# Patient Record
Sex: MALE | Race: WHITE | NOT HISPANIC OR LATINO | Employment: FULL TIME | URBAN - METROPOLITAN AREA
[De-identification: names, ages, dates, MRNs, and addresses within clinical notes are randomized per-mention and may not be internally consistent; named-entity substitution may affect disease eponyms.]

---

## 2017-01-20 ENCOUNTER — COMMUNICATION - HEALTHEAST (OUTPATIENT)
Dept: INTERNAL MEDICINE | Facility: CLINIC | Age: 42
End: 2017-01-20

## 2017-02-22 ENCOUNTER — COMMUNICATION - HEALTHEAST (OUTPATIENT)
Dept: INTERNAL MEDICINE | Facility: CLINIC | Age: 42
End: 2017-02-22

## 2017-08-08 ENCOUNTER — RECORDS - HEALTHEAST (OUTPATIENT)
Dept: GENERAL RADIOLOGY | Facility: CLINIC | Age: 42
End: 2017-08-08

## 2017-08-08 ENCOUNTER — OFFICE VISIT - HEALTHEAST (OUTPATIENT)
Dept: INTERNAL MEDICINE | Facility: CLINIC | Age: 42
End: 2017-08-08

## 2017-08-08 DIAGNOSIS — R22.32 SKIN LUMP OF ARM, LEFT: ICD-10-CM

## 2017-08-08 DIAGNOSIS — R07.81 RIB PAIN ON LEFT SIDE: ICD-10-CM

## 2017-08-08 DIAGNOSIS — R07.81 PLEURODYNIA: ICD-10-CM

## 2017-08-08 ASSESSMENT — MIFFLIN-ST. JEOR: SCORE: 1754.01

## 2017-10-05 ENCOUNTER — RECORDS - HEALTHEAST (OUTPATIENT)
Dept: ADMINISTRATIVE | Facility: OTHER | Age: 42
End: 2017-10-05

## 2017-10-27 ENCOUNTER — SURGERY - HEALTHEAST (OUTPATIENT)
Dept: SURGERY | Facility: CLINIC | Age: 42
End: 2017-10-27

## 2017-10-27 ENCOUNTER — ANESTHESIA - HEALTHEAST (OUTPATIENT)
Dept: SURGERY | Facility: CLINIC | Age: 42
End: 2017-10-27

## 2017-10-27 ASSESSMENT — MIFFLIN-ST. JEOR: SCORE: 1739.95

## 2017-11-27 ENCOUNTER — COMMUNICATION - HEALTHEAST (OUTPATIENT)
Dept: SCHEDULING | Facility: CLINIC | Age: 42
End: 2017-11-27

## 2017-11-28 ENCOUNTER — OFFICE VISIT - HEALTHEAST (OUTPATIENT)
Dept: INTERNAL MEDICINE | Facility: CLINIC | Age: 42
End: 2017-11-28

## 2017-11-28 DIAGNOSIS — L03.119 CELLULITIS AND ABSCESS OF LEG: ICD-10-CM

## 2017-11-28 DIAGNOSIS — L02.419 CELLULITIS AND ABSCESS OF LEG: ICD-10-CM

## 2017-11-28 ASSESSMENT — MIFFLIN-ST. JEOR: SCORE: 1785.77

## 2017-12-26 ENCOUNTER — RECORDS - HEALTHEAST (OUTPATIENT)
Dept: ADMINISTRATIVE | Facility: OTHER | Age: 42
End: 2017-12-26

## 2018-03-08 ENCOUNTER — OFFICE VISIT - HEALTHEAST (OUTPATIENT)
Dept: FAMILY MEDICINE | Facility: CLINIC | Age: 43
End: 2018-03-08

## 2018-03-08 DIAGNOSIS — H53.8 BLURRY VISION, RIGHT EYE: ICD-10-CM

## 2018-03-08 ASSESSMENT — MIFFLIN-ST. JEOR: SCORE: 1758.42

## 2019-03-01 ENCOUNTER — OFFICE VISIT - HEALTHEAST (OUTPATIENT)
Dept: INTERNAL MEDICINE | Facility: CLINIC | Age: 44
End: 2019-03-01

## 2019-03-01 DIAGNOSIS — R19.7 DIARRHEA, UNSPECIFIED TYPE: ICD-10-CM

## 2019-03-01 DIAGNOSIS — R51.9 HEADACHE: ICD-10-CM

## 2019-03-01 LAB
FLUAV AG SPEC QL IA: NORMAL
FLUBV AG SPEC QL IA: NORMAL

## 2019-03-01 ASSESSMENT — MIFFLIN-ST. JEOR: SCORE: 1733.61

## 2019-12-12 ENCOUNTER — RECORDS - HEALTHEAST (OUTPATIENT)
Dept: ADMINISTRATIVE | Facility: OTHER | Age: 44
End: 2019-12-12

## 2020-02-12 ENCOUNTER — COMMUNICATION - HEALTHEAST (OUTPATIENT)
Dept: INTERNAL MEDICINE | Facility: CLINIC | Age: 45
End: 2020-02-12

## 2020-12-16 ENCOUNTER — OFFICE VISIT - HEALTHEAST (OUTPATIENT)
Dept: INTERNAL MEDICINE | Facility: CLINIC | Age: 45
End: 2020-12-16

## 2020-12-16 DIAGNOSIS — R21 PERIANAL RASH: ICD-10-CM

## 2020-12-16 RX ORDER — TRIAMCINOLONE ACETONIDE 1 MG/G
CREAM TOPICAL
Qty: 15 G | Refills: 0 | Status: SHIPPED | OUTPATIENT
Start: 2020-12-16 | End: 2022-04-14

## 2020-12-16 ASSESSMENT — PATIENT HEALTH QUESTIONNAIRE - PHQ9: SUM OF ALL RESPONSES TO PHQ QUESTIONS 1-9: 0

## 2020-12-22 ENCOUNTER — OFFICE VISIT - HEALTHEAST (OUTPATIENT)
Dept: INTERNAL MEDICINE | Facility: CLINIC | Age: 45
End: 2020-12-22

## 2020-12-22 DIAGNOSIS — L30.9 PERIANAL DERMATITIS: ICD-10-CM

## 2020-12-22 ASSESSMENT — MIFFLIN-ST. JEOR: SCORE: 1756.29

## 2020-12-31 ENCOUNTER — COMMUNICATION - HEALTHEAST (OUTPATIENT)
Dept: INTERNAL MEDICINE | Facility: CLINIC | Age: 45
End: 2020-12-31

## 2021-05-27 ASSESSMENT — PATIENT HEALTH QUESTIONNAIRE - PHQ9: SUM OF ALL RESPONSES TO PHQ QUESTIONS 1-9: 0

## 2021-05-31 VITALS — BODY MASS INDEX: 29.08 KG/M2 | WEIGHT: 191.9 LBS | HEIGHT: 68 IN

## 2021-05-31 VITALS — HEIGHT: 68 IN | WEIGHT: 195 LBS | BODY MASS INDEX: 29.55 KG/M2

## 2021-05-31 VITALS — HEIGHT: 68 IN | BODY MASS INDEX: 30.62 KG/M2 | WEIGHT: 202 LBS

## 2021-06-01 VITALS — WEIGHT: 196 LBS | HEIGHT: 68 IN | BODY MASS INDEX: 29.7 KG/M2

## 2021-06-02 VITALS — BODY MASS INDEX: 30.45 KG/M2 | WEIGHT: 194 LBS | HEIGHT: 67 IN

## 2021-06-05 VITALS
OXYGEN SATURATION: 95 % | BODY MASS INDEX: 31.23 KG/M2 | DIASTOLIC BLOOD PRESSURE: 82 MMHG | HEART RATE: 88 BPM | SYSTOLIC BLOOD PRESSURE: 128 MMHG | HEIGHT: 67 IN | WEIGHT: 199 LBS

## 2021-06-06 NOTE — TELEPHONE ENCOUNTER
FYI - Status Update  Who is Calling: Patient  Update: Patient was calling in for the same request, a retroactive referral for a date of service 12/12/19. Writer will re-route this message to [facility] because patient was seen by Dr. Zapata, who is the provider who made the referral to [facility]. Patient stated he was referred from [facility] to the [facility].  Okay to leave a detailed message?:  Yes [phone]

## 2021-06-06 NOTE — TELEPHONE ENCOUNTER
Type of referral:  Surgical  What provider or facility are you being referred to?  [facility]  Do you have an appointment scheduled?  No, this is for past and future visits  What is your question?  Caller is asking for referral from 12/2019 visit and also needs future referral placed for visits every 6 months. Authorization number for referral is [id number]. Please contact caller if any additional information is needed.  Okay to leave a detailed message: Yes

## 2021-06-06 NOTE — TELEPHONE ENCOUNTER
Dr. Friend's office from [facility] will complete insurance referral since he is the PCP on account.     Insurance referral completed and auto faxed to [employer].     Date of Services 12/11/19 to 12/31/20 for 4 visits.

## 2021-06-12 NOTE — PROGRESS NOTES
"  Office Visit - Follow Up   Star Delgado   41 y.o. male    Date of Visit: 8/8/2017    Chief Complaint   Patient presents with     Mass     on left arm     Chest Pain     walked into the handle of a wheel barrell.        Assessment and Plan   1. Skin lump of arm, left  - Ambulatory referral to General Surgery    2. Rib pain on left side  I suspect he broke a rib, normal healing process  - XR Ribs Left W PA Chest; Future      Return if symptoms worsen or fail to improve, for recheck.     History of Present Illness   This 41 y.o. old man comes in for evaluation of a lump on his left arm.  He noticed this over the past month or so.  It has been getting a little bit larger.  It came on suddenly.  It is firm nontender, mobile.  No other lumps or bumps.  No fever chills no night sweats.  No injury to the area.  Additionally 2 months ago he was using a wheelbarrow in the wheelbarrow caught in 1 of the handles him in the ribs on the left side.  He had extensive bruising and pain for about 2 weeks.  The bruising has resolved but he still has pain especially when he takes a deep breath.  He otherwise is feeling okay.  Breathing difficulties otherwise.  No abdominal pain.    Review of Systems: A comprehensive review of systems was negative except as noted.     Medications, Allergies and Problem List   Reviewed and updated     Physical Exam   General Appearance: No acute distress    /64 (Patient Site: Right Arm, Patient Position: Sitting, Cuff Size: Adult Regular)  Pulse 73  Ht 5' 8\" (1.727 m)  Wt 195 lb (88.5 kg)  SpO2 98%  BMI 29.65 kg/m2    On his left arm forearm he has about a 2 cm x 1 cm subcutaneous lump which is irregular firm and mobile with some erythema of the skin above.  He has mild pain with palpations of the ribs on the left his heart is regular no murmur gallop or rub lungs are clear to auscultation bilaterally abdomen is soft nontender nondistended     Additional Information   No current " outpatient prescriptions on file.     No current facility-administered medications for this visit.      No Known Allergies  Social History   Substance Use Topics     Smoking status: Never Smoker     Smokeless tobacco: None     Alcohol use None       Review and/or order of clinical lab tests:  Review and/or order of radiology tests:  Review and/or order of medicine tests:  Discussion of test results with performing physician:  Decision to obtain old records and/or obtain history from someone other than the patient:  Review and summarization of old records and/or obtaining history from someone other than the patient and.or discussion of case with another health care provider:  Independent visualization of image, tracing or specimen itself:    Time:      Hossein Friend MD

## 2021-06-13 NOTE — PROGRESS NOTES
"Star Delgado is a 45 y.o. male who is being evaluated via a billable video visit.              Damian Nguyễn MDThe patient has been notified of following:     \"This video visit will be conducted via a call between you and your physician/provider. We have found that certain health care needs can be provided without the need for an in-person physical exam.  This service lets us provide the care you need with a video conversation.  If a prescription is necessary we can send it directly to your pharmacy.  If lab work is needed we can place an order for that and you can then stop by our lab to have the test done at a later time.    Video visits are billed at different rates depending on your insurance coverage. Please reach out to your insurance provider with any questions.    If during the course of the call the physician/provider feels a video visit is not appropriate, you will not be charged for this service.\"    Patient has given verbal consent to a Video visit? Yes  How would you like to obtain your AVS? AVS Preference: MyChart.  If dropped by the video visit, the video invitation should be sent to: Send to e-mail at: [email or web address]  Will anyone else be joining your video visit? No        Video Start Time: 1733    Office Visit - Follow Up   Star Delgado   45 y.o. male    Date of Visit: 12/16/2020    Chief Complaint   Patient presents with     Rectal Bleeding     Some spotting, itch, discharge, started years ago but just not improving, recently has skin rash        Assessment and Plan   1. Perianal rash  Perianal irritation we discussed the differential.  Could be psoriatic as this could be a lichenification and irritation from ongoing itching.  I am going to have him utilize some triamcinolone cream for the next few days I want to see him back early next week and will do an exam so I can see what were dealing with here.  He has been told that he had an eye issue that could be made worse by " steroids in the past.  I reassured him that a few days of this and a limited area should not cause any major systemic side effects.  - triamcinolone (KENALOG) 0.1 % cream; Apply two times a day to affected area for no more than 10 days.  Dispense: 15 g; Refill: 0        No follow-ups on file.     History of Present Illness   This 45 y.o. old patient of Dr. Redd's with a history of some perianal irritation as well as some psoriasis on his legs he tells me but never followed by dermatologist who comes in via video for evaluation of ongoing perianal irritation and itch and bleeding and discharge.  He does not think there is major GI bleeding or blood in the stool but occasionally may seek some blood in the stool.  Its primarily on the toilet paper however.  Again the area is quite itchy.  No fevers or chills.  He was going to come in but his wife is concerned about him coming into the clinic.    Review of Systems: A comprehensive review of systems was negative except as noted.     Medications, Allergies and Problem List   Reviewed, reconciled and updated  Post Discharge Medication Reconciliation Status:      Physical Exam   General Appearance:   Pleasant gentleman in no distress vital signs are noted.  Further exam deferred.    There were no vitals taken for this visit.         Additional Information   Current Outpatient Medications   Medication Sig Dispense Refill     triamcinolone (KENALOG) 0.1 % cream Apply two times a day to affected area for no more than 10 days. 15 g 0     No current facility-administered medications for this visit.      No Known Allergies  Social History     Tobacco Use     Smoking status: Never Smoker     Smokeless tobacco: Never Used   Substance Use Topics     Alcohol use: Yes     Alcohol/week: 1.0 - 2.0 standard drinks     Types: 1 - 2 Cans of beer per week     Drug use: No       Review and/or order of clinical lab tests:  Review and/or order of radiology tests:  Review and/or order of  medicine tests:  Discussion of test results with performing physician:  Decision to obtain old records and/or obtain history from someone other than the patient:  Review and summarization of old records and/or obtaining history from someone other than the patient and.or discussion of case with another health care provider:  Independent visualization of image, tracing or specimen itself:    Time:      Damian Nguyễn MD    Additional provider notes:       Video-Visit Details    Type of service:  Video Visit    Video End Time (time video stopped): 1746  Originating Location (pt. Location): Home    Distant Location (provider location):  [facility]     Platform used for Video Visit[facility]    Damian Nguyễn MD

## 2021-06-14 NOTE — PROGRESS NOTES
"  Office Visit - Follow Up   Star Delgado   45 y.o. male    Date of Visit: 12/22/2020    Chief Complaint   Patient presents with     Rash     rectal rash follow up - using Kenalog prn         Assessment and Plan   1. Perianal dermatitis  Much better control with the triamcinolone.  He will continue for another 5 days.  If things do not completely continue to resolve after that point or worsen they will let me know.    As an aside, he rode his bike here to our office.  He had helmet.  I asked him about lites and he stated he did not have any.  I urged him to walk his bike home or call someone to help get him as I was concerned about him riding at night with no safety lights.        No follow-ups on file.     History of Present Illness   This 45 y.o. old patient of Dr. Galvez comes in today for evaluation of perianal rash.  I was concerned about what we were dealing with his I did a video visit with him last week and I was sure.  I put him on triamcinolone and he states he is doing better.  Less itching.  No bleeding.    Review of Systems: A comprehensive review of systems was negative except as noted.     Medications, Allergies and Problem List   Reviewed, reconciled and updated  Post Discharge Medication Reconciliation Status:      Physical Exam   General Appearance:   Pleasant gentleman no distress.  He has some erythema of the gluteal cleft and perianal area but no open areas and it looks like things are healing up nicely.  Consistent with a dermatitis.  No evidence of infection or cellulitis or dermatophyte.    /82 (Patient Site: Left Arm, Patient Position: Sitting, Cuff Size: Adult Regular)   Pulse 88   Ht 5' 7\" (1.702 m)   Wt 199 lb (90.3 kg)   SpO2 95%   BMI 31.17 kg/m           Additional Information   Current Outpatient Medications   Medication Sig Dispense Refill     triamcinolone (KENALOG) 0.1 % cream Apply two times a day to affected area for no more than 10 days. 15 g 0     No current " facility-administered medications for this visit.      No Known Allergies  Social History     Tobacco Use     Smoking status: Never Smoker     Smokeless tobacco: Never Used   Substance Use Topics     Alcohol use: Yes     Alcohol/week: 1.0 - 2.0 standard drinks     Types: 1 - 2 Cans of beer per week     Drug use: No       Review and/or order of clinical lab tests:  Review and/or order of radiology tests:  Review and/or order of medicine tests:  Discussion of test results with performing physician:  Decision to obtain old records and/or obtain history from someone other than the patient:  Review and summarization of old records and/or obtaining history from someone other than the patient and.or discussion of case with another health care provider:  Independent visualization of image, tracing or specimen itself:    Time: not applicable     Damian Nguyễn MD

## 2021-06-14 NOTE — PROGRESS NOTES
"OFFICE VISIT NOTE    Subjective:   Chief Complaint:  Wound Infection (surgical incision on left inner thigh.)    42-year-old man who biopsy about 3 or 4 weeks ago of some lesions on his leg and left arm.  Biopsy report shows a pilomatrixoma.  On his left thigh biopsy site he developed some increasing redness and slight amount of drainage.  No fevers or chills.    Current Outpatient Prescriptions   Medication Sig     cephalexin (KEFLEX) 500 MG capsule Take 1 capsule (500 mg total) by mouth 4 (four) times a day for 5 days.       Review of Systems:  A comprehensive review of systems is negative except for the comments above    Objective:    Pulse 64  Temp 98.7  F (37.1  C) (Oral)   Ht 5' 8\" (1.727 m)  Wt 202 lb (91.6 kg)  SpO2 97%  BMI 30.71 kg/m2  GENERAL: No acute distress.  He has a quarter-sized area of erythema.  On the central aspect of the lesion there is a small amount of yellow drainage.  Biopsy site in the left arm looks okay.    Assessment & Plan   Star Delgado is a 42 y.o. male.    Cellulitis to the area of previous biopsy.  I have asked him to put warm packs in this area twice daily for 15-20 minutes.  Cephalexin 500 4 times daily.  Cover with a loose bandage.  I think this will disappear rather quickly.  He wants a copy of the biopsy report.  I told the patient, Dr. Friend should check the biopsy site in about 5 weeks.    Diagnoses and all orders for this visit:    Cellulitis and abscess of leg  -     cephalexin (KEFLEX) 500 MG capsule; Take 1 capsule (500 mg total) by mouth 4 (four) times a day for 5 days.  Dispense: 20 capsule; Refill: 0        Rodrigue Shelley MD  Transcription using voice recognition software, may contain typographical errors.    "

## 2021-06-16 NOTE — PROGRESS NOTES
"Assessment and Plan    1. Blurry vision, right eye  No clear cause; exam normal  - Ambulatory referral to Ophthalmology     Discussed with him and our schedulers that our goal would be to have him be seen today or tomorrow    Jayda Zapata MD     -------------------------------------------    Chief Complaint   Patient presents with     Eye Burn     blurring vision shadow upon closing and opening and slight stinging sensation     Symptoms started in Star's right eye - not sure how long they have been there - a few weeks.  Started to notice that right eye was blurrier: he was trying to read a pill bottle - had a problem seeing it. . Left eye is clear - right eye is blurry \"the world sometimes looks like a hologram.\"   He is noting a little soreness in right eye. Feels like there might be stuff in right eye. No aching. He describes the feeling in right as like when he gets salty sweat in his eyes. When he looks ahead he sees a circular area in his vision that is a little darker and blurrier than the surrounding areas - only with both eye or right eye, not with left eye.  He cn think of nothing that could have irritated his eyes, other than  getting more screen time than usual at work due to staffing shortages    ROS:  - No ongoing medical problems  - No recent illnesses - short live flu  - No fever, no joint aches (other than \"I'm 42 years old\")   - he has been getting fewer hours of sleep than he should  - No headaches or anything out of the usual  - Family members have glasses but not glaucoma  - No numbness or tingling      Works for [employer] [place] [employer] [place]  Other concerns:    Patient Active Problem List   Diagnosis     Unspecified acute reaction to stress       No current outpatient prescriptions on file prior to visit.     No current facility-administered medications on file prior to visit.        History   Smoking Status     Never Smoker   Smokeless Tobacco     Never Used       History   Alcohol Use     " 0.6 - 1.2 oz/week     1 - 2 Cans of beer per week         Vitals:    03/08/18 1200   BP: 120/80   Pulse: 89   Resp: 14   SpO2: 98%     Body mass index is 29.81 kg/(m^2).     EXAM:    General appearance - alert, well appearing, and in no distress  Mental status - normal mood, behavior, speech, dress, motor activity, and thought processes  Eyes - pupils equal and reactive to light and accomodation, extraocular eye movements intact, funduscopic exam normal, discs flat and sharp, sclera and conjunctiva clear  Mouth - mucous membranes moist, pharynx normal without lesions  Neurological - cranial nerves II through XII intact

## 2021-06-24 NOTE — PROGRESS NOTES
"  Office Visit - Follow Up   Star Delgado   43 y.o. male    Date of Visit: 3/1/2019    Chief Complaint   Patient presents with     Flu Symptoms     5-6 days- headaches, upset stomach, feeling nauseous, chills/cold. Taking Advil and Pepto.      Diarrhea     5-6 days         Assessment and Plan   1. Diarrhea, unspecified type  Consistent with infectious diarrhea, likely self-limited although duration is a bit long.  He will bring in stool samples if diarrhea persists over the weekend.  Trial of Imodium.  - Culture, Stool; Future  - C. difficile Toxigenic by PCR; Future    2. Headache  - Influenza A/B Rapid Test- Nasal Swab    Return in about 2 weeks (around 3/15/2019) for recheck.     History of Present Illness   This 43 y.o. old man comes in for evaluation of diarrhea.  This is been going on for 4-5 days.  Started with some nausea without vomiting and now is having multiple loose watery stools per day.  He has tried some Pepto-Bismol.  This is not helped much.  No black or tarry stools until he started Pepto-Bismol and all stools are black.  No blood in the stool.  Some mild abdominal pain.  Possible fever initially now resolved.  No other symptoms.  Had some tacos night before onset.  Wife had similar symptoms.  Kids okay.    Review of Systems: A comprehensive review of systems was negative except as noted.     Medications, Allergies and Problem List   Reviewed, reconciled and updated     Physical Exam   General Appearance:   No acute distress    /76 (Patient Site: Right Arm, Patient Position: Sitting, Cuff Size: Adult Regular)   Pulse 60   Ht 5' 7\" (1.702 m)   Wt 194 lb (88 kg)   SpO2 98%   BMI 30.38 kg/m        Cardiovascular regular rate and rhythm no murmur gallop or rub  Pulmonary lungs are clear to auscultation bilaterally  Gastrointestinall abdomen soft nontender nondistended no organomegaly  Neurologic exam is non focal  Psychiatric pleasant, no confusion or agitation        Additional " Information   No current outpatient medications on file.     No current facility-administered medications for this visit.      No Known Allergies  Social History     Tobacco Use     Smoking status: Never Smoker     Smokeless tobacco: Never Used   Substance Use Topics     Alcohol use: Yes     Alcohol/week: 0.6 - 1.2 oz     Types: 1 - 2 Cans of beer per week     Drug use: No       Review and/or order of clinical lab tests:  Review and/or order of radiology tests:  Review and/or order of medicine tests:  Discussion of test results with performing physician:  Decision to obtain old records and/or obtain history from someone other than the patient:  Review and summarization of old records and/or obtaining history from someone other than the patient and.or discussion of case with another health care provider:  Independent visualization of image, tracing or specimen itself:    Time:      Hossein Friend MD

## 2021-06-26 ENCOUNTER — HEALTH MAINTENANCE LETTER (OUTPATIENT)
Age: 46
End: 2021-06-26

## 2021-08-23 ENCOUNTER — NURSE TRIAGE (OUTPATIENT)
Dept: NURSING | Facility: CLINIC | Age: 46
End: 2021-08-23

## 2021-08-23 NOTE — TELEPHONE ENCOUNTER
Wife calling.    Vertigo and balance issues started 2 weeks ago.  Similar symptoms 20 years ago.    Yesterday nausea and vomiting was bad and went to urgency room.  Every time he moves he vomits. Was prescribed 2 medications.    Caller not with patient now, she is in car dropping off kids.    She reports patient has vomited 6 x in the past hour alone.  She is not sure if he is urinating any longer.  She says he looks miserable. She agrees he is significantly worse since leaving ER last ngiht and has lost all fluids he was given via IV last night by vomiting over the last 12 + hours.    Bring him to the ER for evaluation.  She agrees with plan.    Hoa Mercado RN  [facility] Nurse Advisor      Reason for Disposition    SEVERE vomiting (e.g., 6 or more times/day) (Exception: patient sounds well, is drinking liquids, does not sound dehydrated, and vomiting has lasted less than 24 hours)    Additional Information    Negative: Shock suspected (e.g., cold/pale/clammy skin, too weak to stand, low BP, rapid pulse)    Negative: Difficult to awaken or acting confused (e.g., disoriented, slurred speech)    Negative: Sounds like a life-threatening emergency to the triager    Negative: Vomiting occurs only while coughing    Negative: Pregnant < 20 Weeks and nausea/vomiting began in early pregnancy (i.e., 4-8 weeks pregnant)    Negative: Chest pain    Negative: Headache is main symptom    Protocols used: VOMITING-A-OH

## 2021-08-23 NOTE — TELEPHONE ENCOUNTER
RN Triage:    Spoke with 45 yr old Star who is calling back for triage:    Pt states his wife spoke to triage earlier today, but didn't have the correct information about his episodes of vomiting.    Pt reports:    2 weeks ago developed vertigo.    [facility] for [facility], vomiting, and vertigo.    CT scan negative.      Given 2 medications.  Meclizine and Zofran.    This morning nausea and vomiting again, but only 1 episode.    Pt states he does become nauseated after eating and drinking, but has urinated within 12 hours.    Denies current dizziness but feels tired after taking the Meclizine.    Hx of similar symptoms 15- 20 years ago.    PLAN:  Advised OV within 2 weeks per protocol.  Care advice given per dizziness protocol.  Advised to call back if symptoms are worsening.  Transferred to PSR to schedule OV.    Katelynn Rodriguez RN  [facility]    Reason for Disposition    Dizziness not present now, but is a chronic symptom (recurrent or ongoing AND lasting > 4 weeks)     2 weeks and hx of similar vertigo 15 years ago.    Additional Information    Negative: Shock suspected (e.g., cold/pale/clammy skin, too weak to stand, low BP, rapid pulse)    Negative: Difficult to awaken or acting confused (e.g., disoriented, slurred speech)    Negative: Fainted, and still feels dizzy afterwards    Negative: Severe difficulty breathing (e.g., struggling for each breath, speaks in single words)    Negative: Overdose (accidental or intentional) of medications    Negative: New neurologic deficit that is present now: * Weakness of the face, arm, or leg on one side of the body * Numbness of the face, arm, or leg on one side of the body * Loss of speech or garbled speech    Negative: Heart beating < 50 beats per minute OR > 140 beats per minute    Negative: Sounds like a life-threatening emergency to the triager    Negative: Chest pain    Negative: Rectal bleeding, bloody stool, or tarry-black  stool    Negative: Vomiting is the main symptom    Negative: Diarrhea is the main symptom    Negative: Headache is the main symptom    Negative: Heat exhaustion suspected (i.e., dehydration from heat exposure)    Negative: Patient states that he/she is having an anxiety/panic attack    Negative: SEVERE dizziness (e.g., unable to stand, requires support to walk, feels like passing out now)    Negative: SEVERE headache or neck pain    Negative: Spinning or tilting sensation (vertigo) present now and one or more stroke risk factors (i.e., hypertension, diabetes, prior stroke/TIA, heart attack, age over 60) (Exception: prior physician evaluation for this AND no different/worse than usual)    Negative: Loss of vision or double vision    Negative: Extra heart beats OR irregular heart beating (i.e., 'palpitations')    Negative: Difficulty breathing    Negative: Drinking very little and has signs of dehydration (e.g., no urine > 12 hours, very dry mouth, very lightheaded)    Negative: Follows bleeding (e.g., stomach, rectum, vagina) (Exception: became dizzy from sight of small amount blood)    Negative: Patient sounds very sick or weak to the triager    Negative: Lightheadedness (dizziness) present now, after 2 hours of rest and fluids    Negative: Spinning or tilting sensation (vertigo) present now    Negative: Fever > 103 F (39.4 C)    Negative: Fever > 100.0 F (37.8 C) and has diabetes mellitus or a weak immune system (e.g., HIV positive, cancer chemotherapy, organ transplant, splenectomy, chronic steroids)    Negative: Vomiting occurs with dizziness     Has been evaluated.  [facility]    Negative: Patient wants to be seen    Negative: Taking a medicine that could cause dizziness (e.g., blood pressure medications, diuretics)    Negative: Diabetes    Protocols used: DIZZINESS-A-OH

## 2021-08-25 ENCOUNTER — OFFICE VISIT (OUTPATIENT)
Dept: INTERNAL MEDICINE | Facility: CLINIC | Age: 46
End: 2021-08-25
Payer: COMMERCIAL

## 2021-08-25 VITALS
BODY MASS INDEX: 32.02 KG/M2 | HEIGHT: 67 IN | HEART RATE: 74 BPM | DIASTOLIC BLOOD PRESSURE: 68 MMHG | SYSTOLIC BLOOD PRESSURE: 110 MMHG | OXYGEN SATURATION: 98 % | WEIGHT: 204 LBS

## 2021-08-25 DIAGNOSIS — H61.22 IMPACTED CERUMEN OF LEFT EAR: Primary | ICD-10-CM

## 2021-08-25 DIAGNOSIS — H81.12 BENIGN PAROXYSMAL POSITIONAL VERTIGO, LEFT: ICD-10-CM

## 2021-08-25 PROCEDURE — 69209 REMOVE IMPACTED EAR WAX UNI: CPT | Mod: LT | Performed by: NURSE PRACTITIONER

## 2021-08-25 PROCEDURE — 99213 OFFICE O/P EST LOW 20 MIN: CPT | Mod: 25 | Performed by: NURSE PRACTITIONER

## 2021-08-25 RX ORDER — MECLIZINE HYDROCHLORIDE 25 MG/1
1 TABLET ORAL PRN
COMMUNITY
Start: 2021-08-22 | End: 2022-04-14

## 2021-08-25 RX ORDER — ONDANSETRON 4 MG/1
1 TABLET, ORALLY DISINTEGRATING ORAL PRN
COMMUNITY
Start: 2021-08-22 | End: 2022-04-14

## 2021-08-25 ASSESSMENT — MIFFLIN-ST. JEOR: SCORE: 1778.97

## 2021-08-25 NOTE — PATIENT INSTRUCTIONS
Keep your ears clean and dry.    Take the Meclizine and Zofran as needed.    Follow up if having continued symptoms.    See attached handout.   Patient Education     Benign Paroxysmal Positional Vertigo     Your health care provider may move your head in certain ways to treat your BPPV.   Benign paroxysmal positional vertigo (BPPV) is a problem with the inner ear. The inner ear contains the vestibular system. This system is what helps you keep your balance. BPPV causes a feeling of spinning. It is a common problem of the vestibular system.   Understanding the vestibular system  The vestibular system of the ear is made up of very tiny parts. They include the utricle, saccule, and semicircular canals. The utricle is a tiny organ that contains calcium crystals. In some people, the crystals can move into the semicircular canals. When this happens, the system no longer works as it should. This causes BPPV. Benign means it is not life threatening. Paroxysmal means it happens suddenly. Positional means that it happens when you move your head. Vertigo is a feeling of spinning.   What causes BPPV?  Causes include injury to your head or neck. Other problems with the vestibular system may cause BPPV. In many people, the cause of BPPV is not known.   Symptoms of BPPV  You may have repeated feelings of spinning (vertigo). The vertigo usually lasts less than 1 minute. Some movements, such as rolling over in bed, can bring on vertigo.   Diagnosing BPPV  Your primary healthcare provider may diagnose and treat your BPPV. Or you may see an ear, nose, and throat healthcare provider (otolaryngologist). In some cases, you may see a healthcare provider who focuses on the nervous system (neurologist).   The healthcare provider will ask about your symptoms and your medical history. He or she will examine you. You may have hearing and balance tests. As part of the exam, your healthcare provider may have you move your head and body in  certain ways. If you have BPPV, the movements can bring on vertigo. Your provider will also look for abnormal movements of your eyes. You may have other tests to check your vestibular or nervous systems.   Treatment for BPPV  Your healthcare provider may try to move the calcium crystals. This is done by having you move your head and neck in certain ways. This treatment is safe and often works well. You may also be told to do these movements at home. You may still have vertigo for a few weeks. Your healthcare provider will recheck your symptoms, usually in about a month. Special physical therapy may also be part of treatment. In rare cases, surgery may be needed for BPPV that does not go away. Talk with your healthcare provider about whether it is safe for you to drive.   When to call the healthcare provider  Call your healthcare provider right away if you have any of these:    Symptoms that do not go away with treatment    Symptoms that get worse    New symptoms  Adi last reviewed this educational content on 2/1/2020 2000-2021 [employer]. All rights reserved. This information is not intended as a substitute for professional medical care. Always follow your healthcare professional's instructions.

## 2021-08-25 NOTE — PROGRESS NOTES
Clinic Note    Assessment:     Assessment and Plan:  1. Impacted cerumen of left ear: Left TM and canal were completely occluded by cerumen. This was removed via CA with irrigation. TM on recheck was normal, left canal was slightly red (likely from removal). Discussed keeping his ear clean and dry. Monitor for drainage, worsening pain, fever, or chills. Follow up if this occurs.   - REMOVE IMPACTED CERUMEN    2. Benign paroxysmal positional vertigo, left: Vertigo started last week. Was seen at the [facility] on 08/22; normal head CT. He has taken a few doses of meclizine and zofran, none today. Able to eat and drink normally. Sent home exercises to perform, Epley and Edd-Hallpike. Follow up if symptoms persist or worsen.  - Rest, push fluids.  - Meclizine or Zofran as needed.  - Keep ears clean and dry.  - Follow up if symptoms persist or worsen.        Patient Instructions     Keep your ears clean and dry.    Take the Meclizine and Zofran as needed.    Follow up if having continued symptoms.    See attached handout.   Patient Education     Benign Paroxysmal Positional Vertigo     Your health care provider may move your head in certain ways to treat your BPPV.   Benign paroxysmal positional vertigo (BPPV) is a problem with the inner ear. The inner ear contains the vestibular system. This system is what helps you keep your balance. BPPV causes a feeling of spinning. It is a common problem of the vestibular system.   Understanding the vestibular system  The vestibular system of the ear is made up of very tiny parts. They include the utricle, saccule, and semicircular canals. The utricle is a tiny organ that contains calcium crystals. In some people, the crystals can move into the semicircular canals. When this happens, the system no longer works as it should. This causes BPPV. Benign means it is not life threatening. Paroxysmal means it happens suddenly. Positional means that it happens when you move your head.  Vertigo is a feeling of spinning.   What causes BPPV?  Causes include injury to your head or neck. Other problems with the vestibular system may cause BPPV. In many people, the cause of BPPV is not known.   Symptoms of BPPV  You may have repeated feelings of spinning (vertigo). The vertigo usually lasts less than 1 minute. Some movements, such as rolling over in bed, can bring on vertigo.   Diagnosing BPPV  Your primary healthcare provider may diagnose and treat your BPPV. Or you may see an ear, nose, and throat healthcare provider (otolaryngologist). In some cases, you may see a healthcare provider who focuses on the nervous system (neurologist).   The healthcare provider will ask about your symptoms and your medical history. He or she will examine you. You may have hearing and balance tests. As part of the exam, your healthcare provider may have you move your head and body in certain ways. If you have BPPV, the movements can bring on vertigo. Your provider will also look for abnormal movements of your eyes. You may have other tests to check your vestibular or nervous systems.   Treatment for BPPV  Your healthcare provider may try to move the calcium crystals. This is done by having you move your head and neck in certain ways. This treatment is safe and often works well. You may also be told to do these movements at home. You may still have vertigo for a few weeks. Your healthcare provider will recheck your symptoms, usually in about a month. Special physical therapy may also be part of treatment. In rare cases, surgery may be needed for BPPV that does not go away. Talk with your healthcare provider about whether it is safe for you to drive.   When to call the healthcare provider  Call your healthcare provider right away if you have any of these:    Symptoms that do not go away with treatment    Symptoms that get worse    New symptoms  Adi last reviewed this educational content on 2/1/2020 2000-2021 The  "[employer]. All rights reserved. This information is not intended as a substitute for professional medical care. Always follow your healthcare professional's instructions.             Return if symptoms worsen or fail to improve.         Subjective:      Star Delgado is a 45 year old male presents today for follow up of his vertigo.    He reports that he had this first about 15 years ago, lasted a few weeks and was horrible.    [facility] reports [facility], had a complete workup, including a head CT that was normal.     He reports that the dizziness continues, he has been taking the meclizine and zofran as needed. He has not required any today.    He reports doing some vestibular therapy he found online, has been blowing his nose and trying to pop his ears.    He reports cleaning his right ear out of cerumen, left still feels blocked.    He reports today he was able to eat and drink at lunch time without issues.    He denies any new symptoms today.     The following portions of the patient's history were reviewed and updated as appropriate.    Review of Systems:    Review is otherwise negative except for what is mentioned above.     Social Hx:    History   Smoking Status     Never Smoker   Smokeless Tobacco     Never Used         Objective:     Vitals:    08/25/21 1539   BP: 110/68   BP Location: Right arm   Patient Position: Sitting   Cuff Size: Adult Regular   Pulse: 74   SpO2: 98%   Weight: 92.5 kg (204 lb)   Height: 1.702 m (5' 7\")       Exam:    General: No apparent distress. Calm. Alert and Oriented X3. Pt behavior is appropriate.  Head:Atraumatic. Normocephalic, non-tender to palpation.  Neck: Supple. No adenopathy  Eyes: PERRLA, No discharge. No strabismus. No nystagmus.  Ears: Left ear was occluded with cerumen, this was removed per irrigation. Recheck of TM was normal. Slight redness to canal. Right TM pearly gray with landmarks visible.   Nose/Mouth/Throat: Patent nares, no " oral lesions, pharynx clear and without exudate. Uvula mid-line. Nasal septum mid-line. Clear turbinates.   Chest/Lungs: Normal chest wall, clear to auscultation, normal respiratory effort and rate.   Heart/Pulses: Regular rate and rhythm, no murmurs, gallops, or rubs. Capillary refill <2 seconds. No edema.   Musculoskeletal: Walks without difficulty.   Neurologic: Interactive, alert, no focal findings.  Skin: Warm, dry.     Patient Active Problem List   Diagnosis     Unspecified acute reaction to stress     Current Outpatient Medications   Medication Sig Dispense Refill     meclizine (ANTIVERT) 25 MG tablet Take 1 tablet by mouth as needed       ondansetron (ZOFRAN-ODT) 4 MG ODT tab Take 1 tablet by mouth as needed       triamcinolone (KENALOG) 0.1 % cream [TRIAMCINOLONE (KENALOG) 0.1 % CREAM] Apply two times a day to affected area for no more than 10 days. 15 g 0     Shama Landers Adult-Geriatric Nurse Practitioner  [facility] - Internal Medicine Team     8/25/2021

## 2021-10-11 ENCOUNTER — HEALTH MAINTENANCE LETTER (OUTPATIENT)
Age: 46
End: 2021-10-11

## 2022-04-14 ENCOUNTER — TELEPHONE (OUTPATIENT)
Dept: INTERNAL MEDICINE | Facility: CLINIC | Age: 47
End: 2022-04-14

## 2022-04-14 ENCOUNTER — ANCILLARY PROCEDURE (OUTPATIENT)
Dept: GENERAL RADIOLOGY | Facility: CLINIC | Age: 47
End: 2022-04-14
Attending: FAMILY MEDICINE
Payer: COMMERCIAL

## 2022-04-14 ENCOUNTER — OFFICE VISIT (OUTPATIENT)
Dept: FAMILY MEDICINE | Facility: CLINIC | Age: 47
End: 2022-04-14
Payer: COMMERCIAL

## 2022-04-14 VITALS
DIASTOLIC BLOOD PRESSURE: 68 MMHG | BODY MASS INDEX: 33.49 KG/M2 | WEIGHT: 213.8 LBS | SYSTOLIC BLOOD PRESSURE: 124 MMHG | HEART RATE: 72 BPM

## 2022-04-14 DIAGNOSIS — L40.9 PSORIASIS: ICD-10-CM

## 2022-04-14 DIAGNOSIS — S93.402A SPRAIN OF LEFT ANKLE, UNSPECIFIED LIGAMENT, INITIAL ENCOUNTER: Primary | ICD-10-CM

## 2022-04-14 DIAGNOSIS — M25.572 ACUTE LEFT ANKLE PAIN: ICD-10-CM

## 2022-04-14 PROCEDURE — 73610 X-RAY EXAM OF ANKLE: CPT | Mod: TC | Performed by: RADIOLOGY

## 2022-04-14 PROCEDURE — 99213 OFFICE O/P EST LOW 20 MIN: CPT | Performed by: FAMILY MEDICINE

## 2022-04-14 NOTE — TELEPHONE ENCOUNTER
Called the patient with his x-ray results.  Patient will return for an Aircast splint, as ordered.

## 2022-04-14 NOTE — TELEPHONE ENCOUNTER
Reason for Call:  Other call back    Detailed comments: Pt is requesting a call back regarding his xray - he is unsure of what treatment he should be on... Please advise.    Phone Number Patient can be reached at: Home number on file [phone] (home)    Best Time: any    Can we leave a detailed message on this number? YES    Call taken on 4/14/2022 at 1:52 PM by Godwin Sales

## 2022-04-14 NOTE — PATIENT INSTRUCTIONS
Range of motion exercises.  Elevated the affected extremity.  Ice 20 minutes three times daily.  Over-the-counter medications, only as discussed.  Ace bandage or Air Ankle Stirrup Brace, only as discussed.  Follow up if worsening condition or not improving over the next week.     Follow-up with Dermatology, as discussed.

## 2022-04-14 NOTE — PROGRESS NOTES
Assessment & Plan     Star was seen today for ankle pain.    Diagnoses and all orders for this visit:    Sprain of left ankle, unspecified ligament, initial encounter.  X-rays were negative for fracture.  -     XR Tibia & Fibula Left 2 Views; Future  -     XR Ankle Left G/E 3 Views; Future  -     Ankle/Foot Bracing Supplies DME    Psoriasis, interested in a Dermatology consultation.  -     Adult Dermatology Referral; Future       Discussed risks and benefits of treatment strategies.    Patient Instructions     Range of motion exercises.    Elevated the affected extremity.    Ice 20 minutes three times daily.    Over-the-counter medications, only as discussed.    Ace bandage or Air Ankle Stirrup Brace, only as discussed.    Follow up if worsening condition or not improving over the next week.       Follow-up with Dermatology, as discussed.     Return for Follow up, as noted in Patient Instructions.    Nicole Ramires MD  [facility]    Demarco Graves is a 46 year old male who presents to clinic today for the following health issues:  Chief Complaint   Patient presents with     Ankle Pain     Was running on playground and rolled ankle to the outside     HPI - Ankle Concern    The patient is a 46-year-old male, who was recently on vacation in [place].  The patient was chasing his children 1 week ago, when he rolled his left ankle laterally, causing immediate pain.  Patient has remained ambulatory since his injury.  Patient notes a tightness and pain involving his lateral ankle.  Pain is tolerable at rest, moderate with attempted ambulation.  Condition is overall improving (decreased swelling and pain reported), but the patient requests evaluation today.  Patient reports previous ankle sprains, but he denies previous ankle fracture.    Treatments Tried: Ice, elevation, and Ace bandage.    Patient incidentally has psoriasis involving his anterior knees and gluteal cleft.   Patient would be interested in following up with Dermatology.    Review of Systems      Objective    /68 (BP Location: Right arm, Patient Position: Sitting, Cuff Size: Adult Large)   Pulse 72   Wt 97 kg (213 lb 12.8 oz)   BMI 33.49 kg/m    Physical Exam   GENERAL APPEARANCE:  Awake, alert, and in no acute distress.  PSYCHIATRIC:  Pleasant affect.  HEENT:  Sclera anicteric.  No conjunctivitis.    NECK:  Spontaneous full range of motion.    HEART:  Normal S1, S2.  Regular rate and rhythm.  No murmurs, rubs, or gallops.  LUNGS:  No respiratory distress.  No wheezes, rales, or rhonchi.  EXTREMITIES:  Moves 4 extremities, including the left foot and ankle.     LEFT FOOT/ANKLE EXAM: Full range of motion.  No gross abnormalities.  5/5 strength noted with plantarflexion and dorsiflexion.  There is mild to moderate swelling noted involving the lateral ankle.  There is mild tenderness to palpation noted involving the distal 3 inches of the fibula, lateral malleolus, and base of 5th metatarsal of the foot/ankle.  Remainder of the foot, ankle, and lower leg are not tender to palpation.  No erythema or ecchymosis.  Doralis pedis and posterior tibial pulses are noted to be intact.  Anterior drawer sign negative.  Achilles tendon palpates intact.  NEUROLOGIC:  Gait within normal limits.    SKIN: The patient has 3-4 cm diameter areas of psoriasis (erythematous patches with overlying scale) involving his anterior knees.    X-ray Left Ankle and Tibia/Fibula:  Reviewed by provider.  No acute changes or fracture.  Final reports were reviewed in [facility].    Urgent Care Course:  -X-ray obtained and reviewed.  -Ace bandage application demonstrated.  -Patient was fitted for an Air Ankle Stirrup Brace.  -Follow-up was discussed.

## 2022-05-02 ENCOUNTER — MYC MEDICAL ADVICE (OUTPATIENT)
Dept: DERMATOLOGY | Facility: CLINIC | Age: 47
End: 2022-05-02
Payer: COMMERCIAL

## 2022-05-02 ENCOUNTER — VIRTUAL VISIT (OUTPATIENT)
Dept: DERMATOLOGY | Facility: CLINIC | Age: 47
End: 2022-05-02
Attending: FAMILY MEDICINE
Payer: COMMERCIAL

## 2022-05-02 DIAGNOSIS — L40.0 PSORIASIS VULGARIS: Primary | ICD-10-CM

## 2022-05-02 PROCEDURE — 99203 OFFICE O/P NEW LOW 30 MIN: CPT | Mod: TEL | Performed by: DERMATOLOGY

## 2022-05-02 RX ORDER — BETAMETHASONE DIPROPIONATE 0.5 MG/G
OINTMENT, AUGMENTED TOPICAL 2 TIMES DAILY
Qty: 50 G | Refills: 3 | Status: SHIPPED | OUTPATIENT
Start: 2022-05-02 | End: 2024-06-10

## 2022-05-02 RX ORDER — CALCIPOTRIENE 50 UG/G
OINTMENT TOPICAL 2 TIMES DAILY
Qty: 90 G | Refills: 3 | Status: SHIPPED | OUTPATIENT
Start: 2022-05-02 | End: 2024-06-10

## 2022-05-02 NOTE — PATIENT INSTRUCTIONS
Use augmented betamethasone ointment twice daily  As psoriasis improves, switch to calcipotriene ointment twice daily

## 2022-05-02 NOTE — LETTER
5/2/2022       RE: Star Delgado  [address]  [place] [address]     Dear Colleague,    Thank you for referring your patient, Star Delgado, to the [facility] [place] at [facility]. Please see a copy of my visit note below.    [facility] Dermatology Note  Encounter Date: May 2, 2022  [employer] and Telephone ([phone]). Location of teledermatologist: [facility] [place].  Start time: 2:15. End time: 2:32.    Dermatology Problem List:  1. Psoriasis vulgaris: anterior knees, gluteal cleft  - augmented betamethasone ointment, calcipotriene ointment    ____________________________________________    Assessment & Plan:     1. Psoriasis vulgaris: typical distribution on knees; suspect gluteal cleft involvement also psoriasis. No evidence of joint involvement. Will start with topicals.  - augmented betamethasone ointment  - calcipotriene ointment    Procedures Performed:    None    Follow-up: 3 months    Staff:     Rodrigue Ring MD, FAAD   of Dermatology  Department of Dermatology  [facility]    ____________________________________________    CC: Psoriasis (Ongoing a couple of years)    HPI:  Mr. Star Delgado is a(n) 46 year old male who presents today as a new patient for psoriasis    Psoriasis - on knees and in gluteal cleft  - coal tar helps with flakiness and level of scale - recedes a bit but skin still pink  - recently using Vaseline  - in gluteal cleft - sometimes bleeding with toilet paper - started about same time  - present for a few years  - periodically itchy or bleeding  - in last year - some pain going up and down stairs - notes that sits a lot currently - tightness in knees; no other joints affected   - not much AM stiffness    Patient is otherwise feeling well, without additional skin  concerns.    Labs Reviewed:  N/A    Physical Exam:  Vitals: There were no vitals taken for this visit.  SKIN: Teledermatology photos were reviewed; image quality and interpretability: acceptable. Image date: 5/2/22.  - sharply demarcated erythematous plaques on the knees with hyperkeratotic overlying scale  - No other lesions of concern on areas examined.     Medications:  No current outpatient medications on file.     No current facility-administered medications for this visit.      Past Medical/Surgical History:   Patient Active Problem List   Diagnosis     Unspecified acute reaction to stress     Past Medical History:   Diagnosis Date     Arm skin lesion, left      Leg skin lesion, left        CC Nicole Ramires MD  [address] [place] [address] on close of this encounter.

## 2022-05-02 NOTE — PROGRESS NOTES
[facility] Dermatology Note  Encounter Date: May 2, 2022  Store-and-Forward and Telephone ([phone]). Location of teledermatologist: [facility] [place].  Start time: 2:15. End time: 2:32.    Dermatology Problem List:  1. Psoriasis vulgaris: anterior knees, gluteal cleft  - augmented betamethasone ointment, calcipotriene ointment    ____________________________________________    Assessment & Plan:     1. Psoriasis vulgaris: typical distribution on knees; suspect gluteal cleft involvement also psoriasis. No evidence of joint involvement. Will start with topicals.  - augmented betamethasone ointment  - calcipotriene ointment    Procedures Performed:    None    Follow-up: 3 months    Staff:     Rodrigue Ring MD, FAAD   of Dermatology  Department of Dermatology  [facility]    ____________________________________________    CC: Psoriasis (Ongoing a couple of years)    HPI:  Mr. Star Delgado is a(n) 46 year old male who presents today as a new patient for psoriasis    Psoriasis - on knees and in gluteal cleft  - coal tar helps with flakiness and level of scale - recedes a bit but skin still pink  - recently using Vaseline  - in gluteal cleft - sometimes bleeding with toilet paper - started about same time  - present for a few years  - periodically itchy or bleeding  - in last year - some pain going up and down stairs - notes that sits a lot currently - tightness in knees; no other joints affected   - not much AM stiffness    Patient is otherwise feeling well, without additional skin concerns.    Labs Reviewed:  N/A    Physical Exam:  Vitals: There were no vitals taken for this visit.  SKIN: Teledermatology photos were reviewed; image quality and interpretability: acceptable. Image date: 5/2/22.  - sharply demarcated erythematous plaques on the knees with hyperkeratotic overlying scale  - No other lesions of  concern on areas examined.     Medications:  No current outpatient medications on file.     No current facility-administered medications for this visit.      Past Medical/Surgical History:   Patient Active Problem List   Diagnosis     Unspecified acute reaction to stress     Past Medical History:   Diagnosis Date     Arm skin lesion, left      Leg skin lesion, left        CC Nicole Ramires MD  [address] [place] [address] on close of this encounter.

## 2022-05-02 NOTE — NURSING NOTE
Chief Complaint   Patient presents with     Psoriasis     Ongoing a couple of years     Teledermatology Nurse Call Patients:     Are you in the [facility] at the time of the encounter? yes    Today's visit will be billed to you and your insurance.    A teledermatology visit is not as thorough as an in-person visit and the quality of the photograph sent may not be of the same quality as that taken by the dermatology clinic.    GODFREY Royal

## 2022-08-13 ENCOUNTER — HEALTH MAINTENANCE LETTER (OUTPATIENT)
Age: 47
End: 2022-08-13

## 2022-08-22 ENCOUNTER — VIRTUAL VISIT (OUTPATIENT)
Dept: DERMATOLOGY | Facility: CLINIC | Age: 47
End: 2022-08-22
Payer: COMMERCIAL

## 2022-08-22 DIAGNOSIS — L40.0 PSORIASIS VULGARIS: Primary | ICD-10-CM

## 2022-08-22 PROCEDURE — 99213 OFFICE O/P EST LOW 20 MIN: CPT | Mod: TEL | Performed by: DERMATOLOGY

## 2022-08-22 NOTE — NURSING NOTE
Chief Complaint   Patient presents with     Video Visit     Psoriasis. Joint stiffness.     Teledermatology Nurse Call Patients:     Are you in the [facility] at the time of the encounter? yes

## 2022-08-22 NOTE — PROGRESS NOTES
Star is a 46 year old who is being evaluated via a billable telephone visit.      Patient denies any changes since echeck-in regarding medication and allergies and states all information entered during echeck-in remains accurate.    [facility] Dermatology Note  Encounter Date: Aug 22, 2022  Telephone ([phone]). Location of teledermatologist: [facility] [place]. Start time: 10:23. End time: 10:29.    Dermatology Problem List:  1. Psoriasis vulgaris: anterior knees, gluteal cleft  - augmented betamethasone ointment, calcipotriene ointment       ____________________________________________    Assessment & Plan:     1. Psoriasis vulgaris: improved on current regimen. We discussed warning signs/symptoms of psoriatic arthritis, which are currently absent. Will continue topicals given limited BSA.  - augmented betamethasone ointment, calcipotriene ointment    Procedures Performed:    None    Follow-up: 6-12 months    Staff:     Rodrigue Ring MD, FAAD   of Dermatology  Department of Dermatology  [facility]    ____________________________________________    CC: Video Visit (Psoriasis. Joint stiffness.)    HPI:  Mr. Star Delgado is a(n) 46 year old male who presents today as a return patient for psoriasis    Psoriasis - using creams a few times per week  - seems to be improving  - joint stiffness - hasn't really noticed anything - no heel pain, no nail changes, no AM stiffness    Patient is otherwise feeling well, without additional skin concerns.    Labs Reviewed:  N/A    Physical Exam:  Vitals: There were no vitals taken for this visit.  SKIN: no photos    Medications:  Current Outpatient Medications   Medication     calcipotriene (DOVONOX) 0.005 % external ointment     augmented betamethasone dipropionate (DIPROLENE-AF) 0.05 % external ointment     No current facility-administered medications for this  visit.      Past Medical/Surgical History:   Patient Active Problem List   Diagnosis     Unspecified acute reaction to stress     Past Medical History:   Diagnosis Date     Arm skin lesion, left      Leg skin lesion, left        CC Hossein Friend MD  [address] [place] [phone] on close of this encounter.

## 2022-08-22 NOTE — LETTER
8/22/2022       RE: Star Delgado  [address] [place] [address]     Dear Colleague,    Thank you for referring your patient, Star Delgado, to the [facility] [place] at [facility]. Please see a copy of my visit note below.    Star is a 46 year old who is being evaluated via a billable telephone visit.      Patient denies any changes since echeck-in regarding medication and allergies and states all information entered during echeck-in remains accurate.    [facility] Dermatology Note  Encounter Date: Aug 22, 2022  Telephone ([phone]). Location of teledermatologist: [facility]. Start time: 10:23. End time: 10:29.    Dermatology Problem List:  1. Psoriasis vulgaris: anterior knees, gluteal cleft  - augmented betamethasone ointment, calcipotriene ointment       ____________________________________________    Assessment & Plan:     1. Psoriasis vulgaris: improved on current regimen. We discussed warning signs/symptoms of psoriatic arthritis, which are currently absent. Will continue topicals given limited BSA.  - augmented betamethasone ointment, calcipotriene ointment    Procedures Performed:    None    Follow-up: 6-12 months    Staff:     Rodrigue Ring MD, FAAD   of Dermatology  Department of Dermatology  [facility]    ____________________________________________    CC: Video Visit (Psoriasis. Joint stiffness.)    HPI:  Mr. Star Delgado is a(n) 46 year old male who presents today as a return patient for psoriasis    Psoriasis - using creams a few times per week  - seems to be improving  - joint stiffness - hasn't really noticed anything - no heel pain, no nail changes, no AM stiffness    Patient is otherwise feeling well, without additional skin concerns.    Labs Reviewed:  N/A    Physical  Exam:  Vitals: There were no vitals taken for this visit.  SKIN: no photos    Medications:  Current Outpatient Medications   Medication     calcipotriene (DOVONOX) 0.005 % external ointment     augmented betamethasone dipropionate (DIPROLENE-AF) 0.05 % external ointment     No current facility-administered medications for this visit.      Past Medical/Surgical History:   Patient Active Problem List   Diagnosis     Unspecified acute reaction to stress     Past Medical History:   Diagnosis Date     Arm skin lesion, left      Leg skin lesion, left        CC Hossein Friend MD  [address] [place], [place] [id number] on close of this encounter.

## 2022-10-30 ENCOUNTER — HEALTH MAINTENANCE LETTER (OUTPATIENT)
Age: 47
End: 2022-10-30

## 2023-01-24 ENCOUNTER — NURSE TRIAGE (OUTPATIENT)
Dept: NURSING | Facility: CLINIC | Age: 48
End: 2023-01-24
Payer: COMMERCIAL

## 2023-01-24 ENCOUNTER — OFFICE VISIT (OUTPATIENT)
Dept: INTERNAL MEDICINE | Facility: CLINIC | Age: 48
End: 2023-01-24
Payer: COMMERCIAL

## 2023-01-24 VITALS
DIASTOLIC BLOOD PRESSURE: 76 MMHG | SYSTOLIC BLOOD PRESSURE: 124 MMHG | TEMPERATURE: 98.2 F | WEIGHT: 210.2 LBS | OXYGEN SATURATION: 97 % | BODY MASS INDEX: 30.09 KG/M2 | HEIGHT: 70 IN | HEART RATE: 72 BPM

## 2023-01-24 DIAGNOSIS — R19.5 CHANGE IN STOOL: Primary | ICD-10-CM

## 2023-01-24 DIAGNOSIS — Z12.11 SPECIAL SCREENING FOR MALIGNANT NEOPLASMS, COLON: ICD-10-CM

## 2023-01-24 LAB
ALBUMIN SERPL BCG-MCNC: 4.3 G/DL (ref 3.5–5.2)
ALP SERPL-CCNC: 81 U/L (ref 40–129)
ALT SERPL W P-5'-P-CCNC: 36 U/L (ref 10–50)
ANION GAP SERPL CALCULATED.3IONS-SCNC: 11 MMOL/L (ref 7–15)
AST SERPL W P-5'-P-CCNC: 24 U/L (ref 10–50)
BILIRUB SERPL-MCNC: 0.6 MG/DL
BUN SERPL-MCNC: 10.2 MG/DL (ref 6–20)
CALCIUM SERPL-MCNC: 9 MG/DL (ref 8.6–10)
CHLORIDE SERPL-SCNC: 106 MMOL/L (ref 98–107)
CREAT SERPL-MCNC: 0.94 MG/DL (ref 0.67–1.17)
DEPRECATED HCO3 PLAS-SCNC: 24 MMOL/L (ref 22–29)
ERYTHROCYTE [DISTWIDTH] IN BLOOD BY AUTOMATED COUNT: 13.1 % (ref 10–15)
GFR SERPL CREATININE-BSD FRML MDRD: >90 ML/MIN/1.73M2
GLUCOSE SERPL-MCNC: 97 MG/DL (ref 70–99)
HCT VFR BLD AUTO: 48.6 % (ref 40–53)
HGB BLD-MCNC: 16.6 G/DL (ref 13.3–17.7)
MCH RBC QN AUTO: 30.1 PG (ref 26.5–33)
MCHC RBC AUTO-ENTMCNC: 34.2 G/DL (ref 31.5–36.5)
MCV RBC AUTO: 88 FL (ref 78–100)
PLATELET # BLD AUTO: 249 10E3/UL (ref 150–450)
POTASSIUM SERPL-SCNC: 3.9 MMOL/L (ref 3.4–5.3)
PROT SERPL-MCNC: 6.5 G/DL (ref 6.4–8.3)
RBC # BLD AUTO: 5.51 10E6/UL (ref 4.4–5.9)
SODIUM SERPL-SCNC: 141 MMOL/L (ref 136–145)
WBC # BLD AUTO: 6.1 10E3/UL (ref 4–11)

## 2023-01-24 PROCEDURE — 36415 COLL VENOUS BLD VENIPUNCTURE: CPT | Performed by: INTERNAL MEDICINE

## 2023-01-24 PROCEDURE — 80053 COMPREHEN METABOLIC PANEL: CPT | Performed by: INTERNAL MEDICINE

## 2023-01-24 PROCEDURE — 85027 COMPLETE CBC AUTOMATED: CPT | Performed by: INTERNAL MEDICINE

## 2023-01-24 PROCEDURE — 99214 OFFICE O/P EST MOD 30 MIN: CPT | Performed by: INTERNAL MEDICINE

## 2023-01-24 ASSESSMENT — PAIN SCALES - GENERAL: PAINLEVEL: NO PAIN (0)

## 2023-01-24 NOTE — PROGRESS NOTES
ASSESSMENT:   1. Change in stool  Likely due to diet variation.  No current abdominal pain.  Labs as ordered to rule out biliary pathology  - Comprehensive metabolic panel; Future  - CBC with platelets; Future  - Comprehensive metabolic panel  - CBC with platelets    2. Special screening for malignant neoplasms, colon  Due for colon cancer screening.  Referral placed.    - Colonoscopy Screening  Referral; Future      PLAN:  Labs as ordered  Avoid fast foods and other fattier foods and return to usual diet  Screening colonoscopy  with  [facility]. Call  them at [phone] to schedule the procedure.   If stools do not normalize over the next week and labs are normal, then contact your primary provider        (Chart documentation was completed, in part, with [employer] voice-recognition software. Even though reviewed, some grammatical, spelling, and word errors may remain.)    Jian Landa MD  Internal Medicine Department  [facility] [place] Graves is a 47 year old, presenting for the following health issues:  Bowel Problems      HPI     Concern - stool changes  Onset:  days ago  Description: change in stool color-grayish whitish in color  Progression of Symptoms:  Happened twice-no BM yet today  Accompanying Signs & Symptoms: cramping feeling  Previous history of similar problem: no  Precipitating factors:        Worsened by: none  Alleviating factors:        Improved by: none  Therapies tried and outcome: pepto bismol     Most recent lab results reviewed with pt.      Normally followed by other clinic.  Meeting patient for the first time  Diet a little more fastfood and lttle more fatty. Had some loose stools and took pepto bismol x1.   Occ cramps in abdomen last 1-2 days  No current abd pain.   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 Rre on TP with hx external hemorrhoid.   1 beer once a week  Slight gassy        Additional ROS:   Constitutional, HEENT,  "Cardiovascular, Pulmonary, GI and , Neuro, MSK and Psych review of systems/symptoms are otherwise negative or unchanged from previous, except as noted above.      OBJECTIVE:  /76   Pulse 72   Temp 98.2  F (36.8  C) (Oral)   Ht 1.765 m (5' 9.5\")   Wt 95.3 kg (210 lb 3.2 oz)   SpO2 97%   BMI 30.60 kg/m     Estimated body mass index is 30.6 kg/m  as calculated from the following:    Height as of this encounter: 1.765 m (5' 9.5\").    Weight as of this encounter: 95.3 kg (210 lb 3.2 oz).    Eye: No scleral icterus  Pulm: Lungs clear to auscultation   CV: Regular rates and rhythm  GI: Soft, nontender all 4 quads currently, Normal active bowel sounds, No hepatosplenomegaly or masses palpable  Ext: Peripheral pulses intact. No edema.   Rectal: small nontender external hemorrhoid tag. No blood seen               "

## 2023-01-24 NOTE — TELEPHONE ENCOUNTER
"Triage Call:     Pt calling to report that he has had two consecutive stools that were gray/white in color. The last BM was yesterday and he also felt a little dizzy and lightheaded yesterday. Pt states that the whites of his eyes are also slightly yellow. Skin is not yellow..     Disposition: See PCP within 24 hours. Pt was transferred to [facility] to make an appt.       Bessie Roman RN  [facility] Nurse Advisor 8:09 AM 1/24/2023        Reason for Disposition    Yellow eyes (jaundice)    [1] Stool is light gray or whitish AND [2] unexplained    Additional Information    Negative: Rectal bleeding, bloody stools, or blood in stool (bowel movement)    Negative: Black or tarry bowel movements    Negative: [1] Stool color is black (not dark green) AND [2] patient hasn't swallowed substance that causes black stools (e.g., Pepto-Bismol, iron pills)    Negative: Diarrhea stools (i.e., watery stools, or increase in the frequency and looseness of stools)    Negative: [1] Abdomen pain is main symptom AND [2] male    Negative: [1] Abdomen pain is main symptom AND [2] adult female    Negative: Unconscious or difficult to awaken    Negative: Acting confused (e.g., disoriented, slurred speech)    Negative: Seizure has occurred    Negative: Has fainted (passed out)    Negative: Very weak (e.g., can't stand)    Negative: Acetaminophen overdose or poisoning suspected    Negative: Sounds like a life-threatening emergency to the triager    Negative: [1] Abdominal pain AND [2] severe    Negative: [1] Constant abdominal pain AND [2] present > 2 hours    Negative: [1] Vomiting AND [2] contains red blood or black (\"coffee ground\") material    Negative: [1] Vomiting AND [2] signs of dehydration (e.g., very dry mouth, lightheaded)    Negative: Fever    Negative: Shaking chills    Negative: [1] Drinking very little AND [2] dehydration suspected (e.g., no urine > 12 hours, very dry mouth, very lightheaded)    Negative: Patient sounds " very sick or weak to the triager    Negative: Abdominal pain    Negative: Jaundice    Negative: Patient sounds very sick or weak to the triager    Protocols used: STOOLS - UNUSUAL COLOR-A-AH, [id number]-VF

## 2023-01-25 NOTE — PATIENT INSTRUCTIONS
Labs as ordered  Avoid fast foods and other fattier foods and return to usual diet  Screening colonoscopy  with  [facility]. Call  them at [phone] to schedule the procedure.   If stools do not normalize over the next week and labs are normal, then contact your primary provider

## 2023-04-13 ENCOUNTER — TRANSFERRED RECORDS (OUTPATIENT)
Dept: HEALTH INFORMATION MANAGEMENT | Facility: CLINIC | Age: 48
End: 2023-04-13
Payer: COMMERCIAL

## 2023-09-09 ENCOUNTER — HEALTH MAINTENANCE LETTER (OUTPATIENT)
Age: 48
End: 2023-09-09

## 2024-06-03 ENCOUNTER — ANCILLARY PROCEDURE (OUTPATIENT)
Dept: GENERAL RADIOLOGY | Facility: CLINIC | Age: 49
End: 2024-06-03
Attending: FAMILY MEDICINE
Payer: COMMERCIAL

## 2024-06-03 ENCOUNTER — OFFICE VISIT (OUTPATIENT)
Dept: FAMILY MEDICINE | Facility: CLINIC | Age: 49
End: 2024-06-03
Payer: COMMERCIAL

## 2024-06-03 VITALS
WEIGHT: 207 LBS | SYSTOLIC BLOOD PRESSURE: 126 MMHG | OXYGEN SATURATION: 97 % | BODY MASS INDEX: 30.13 KG/M2 | RESPIRATION RATE: 16 BRPM | DIASTOLIC BLOOD PRESSURE: 84 MMHG | TEMPERATURE: 97.5 F | HEART RATE: 55 BPM

## 2024-06-03 DIAGNOSIS — M79.671 RIGHT FOOT PAIN: ICD-10-CM

## 2024-06-03 DIAGNOSIS — M79.671 RIGHT FOOT PAIN: Primary | ICD-10-CM

## 2024-06-03 DIAGNOSIS — S90.851D FOREIGN BODY IN RIGHT FOOT, SUBSEQUENT ENCOUNTER: ICD-10-CM

## 2024-06-03 PROCEDURE — 73630 X-RAY EXAM OF FOOT: CPT | Mod: TC | Performed by: RADIOLOGY

## 2024-06-03 PROCEDURE — 99214 OFFICE O/P EST MOD 30 MIN: CPT | Performed by: FAMILY MEDICINE

## 2024-06-03 NOTE — PROGRESS NOTES
Assessment & Plan     Right foot pain  X-ray done independently reviewed did show triangular-shaped foreign body at the right soft tissue plantar forefoot, there was also a small rounded calcification Versus additional foreign body at the tendon of the 3rd Metatarsal bone area, Incidental heel  spur noted.  Management discussed with patient, refer to podiatry for foreign body removal.  - XR Foot Right G/E 3 Views; Future  - Orthopedic  Referral; Future    Foreign body in right foot, subsequent encounter              Subjective   Star is a 48 year old, presenting for the following health issues:  Follow up from ER (Foot injury.)      6/3/2024     8:14 AM   Additional Questions   Roomed by Claudia JOHANSEN   Accompanied by self     History of Present Illness       Reason for visit:  Puncture wound foot    He eats 2-3 servings of fruits and vegetables daily.He consumes 1 sweetened beverage(s) daily.He exercises with enough effort to increase his heart rate 30 to 60 minutes per day.  He exercises with enough effort to increase his heart rate 3 or less days per week.        He stepped on a piece of ceramic plate  at home a few weeks ago, had a wound involving his right plantar forefoot, was seen in the ED, area was cleaned, she was given a course of antibiotic, he is here today because still having pain, slightly improved but persistent, no new injury.  Sharp pain, exacerbated with walking,        Review of Systems  Constitutional, HEENT, cardiovascular, pulmonary, gi and gu systems are negative, except as otherwise noted.      Objective    /84 (BP Location: Left arm, Patient Position: Sitting, Cuff Size: Adult Regular)   Pulse 55   Temp 97.5  F (36.4  C) (Temporal)   Resp 16   Wt 93.9 kg (207 lb)   SpO2 97%   BMI 30.13 kg/m    Body mass index is 30.13 kg/m .  Physical Exam  Constitutional:       Appearance: Normal appearance.   HENT:      Head: Normocephalic and atraumatic.   Cardiovascular:      Rate and  Rhythm: Normal rate and regular rhythm.   Pulmonary:      Effort: Pulmonary effort is normal.      Breath sounds: Normal breath sounds.   Musculoskeletal:      Cervical back: Normal range of motion and neck supple.        Feet:    Neurological:      General: No focal deficit present.      Mental Status: He is alert and oriented to person, place, and time.   Psychiatric:         Behavior: Behavior normal.         Thought Content: Thought content normal.         Judgment: Judgment normal.            Results for orders placed or performed in visit on 06/03/24   XR Foot Right G/E 3 Views     Status: None    Narrative    EXAM: XR FOOT RIGHT G/E 3 VIEWS  LOCATION: [facility]  DATE: 6/3/2024    INDICATION: Check for foreign body right plantar forefoot.  COMPARISON: 5/10/2024.      Impression    IMPRESSION: On the AP view there is a small triangular hyperdense foreign body projecting between the second and third metatarsal necks measuring 3 mm, residing in the plantar soft tissues as seen on the lateral view.    No evidence of an acute fracture. Joint spaces are maintained. Distal Achilles tendon enthesopathy. Calcaneal heel spur.    NOTE: ABNORMAL REPORT    THE DICTATION ABOVE DESCRIBES AN ABNORMALITY FOR WHICH FOLLOW-UP IS NEEDED.                       Signed Electronically by: Patsy Flores MD      Prior to immunization administration, verified patients identity using patient s name and date of birth. Please see Immunization Activity for additional information.     Screening Questionnaire for Adult Immunization    Are you sick today?   No   Do you have allergies to medications, food, a vaccine component or latex?   No   Have you ever had a serious reaction after receiving a vaccination?   No   Do you have a long-term health problem with heart, lung, kidney, or metabolic disease (e.g., diabetes), asthma, a blood disorder, no spleen, complement component deficiency, a cochlear implant, or a  spinal fluid leak?  Are you on long-term aspirin therapy?   No   Do you have cancer, leukemia, HIV/AIDS, or any other immune system problem?   No   Do you have a parent, brother, or sister with an immune system problem?   No   In the past 3 months, have you taken medications that affect  your immune system, such as prednisone, other steroids, or anticancer drugs; drugs for the treatment of rheumatoid arthritis, Crohn s disease, or psoriasis; or have you had radiation treatments?   No   Have you had a seizure, or a brain or other nervous system problem?   No   During the past year, have you received a transfusion of blood or blood    products, or been given immune (gamma) globulin or antiviral drug?   No   For women: Are you pregnant or is there a chance you could become       pregnant during the next month?   No   Have you received any vaccinations in the past 4 weeks?   No     Immunization questionnaire answers were all negative.    This note was completed in part using a voice recognition software, any grammatical or context distortion are unintentional and inherent to the software.    Patient instructed to remain in clinic for 15 minutes afterwards, and to report any adverse reactions.     Screening performed by Claudia Morgan MA on 6/3/2024 at 8:19 AM.

## 2024-06-05 ENCOUNTER — OFFICE VISIT (OUTPATIENT)
Dept: PODIATRY | Facility: CLINIC | Age: 49
End: 2024-06-05
Attending: FAMILY MEDICINE
Payer: COMMERCIAL

## 2024-06-05 VITALS
OXYGEN SATURATION: 97 % | RESPIRATION RATE: 18 BRPM | SYSTOLIC BLOOD PRESSURE: 118 MMHG | DIASTOLIC BLOOD PRESSURE: 70 MMHG | HEART RATE: 61 BPM

## 2024-06-05 DIAGNOSIS — M60.271 FOREIGN BODY GRANULOMA OF SOFT TISSUE OF RIGHT FOOT: Primary | ICD-10-CM

## 2024-06-05 PROBLEM — T14.8XXA PUNCTURE WOUND: Status: ACTIVE | Noted: 2024-05-03

## 2024-06-05 PROCEDURE — 99203 OFFICE O/P NEW LOW 30 MIN: CPT | Performed by: PODIATRIST

## 2024-06-05 RX ORDER — LEVOFLOXACIN 500 MG/1
TABLET, FILM COATED ORAL
COMMUNITY
Start: 2024-05-10 | End: 2024-06-10

## 2024-06-05 ASSESSMENT — PAIN SCALES - GENERAL: PAINLEVEL: NO PAIN (0)

## 2024-06-05 NOTE — LETTER
6/5/2024      Star Delgado  [address] [place] [address]      Dear Colleague,    Thank you for referring your patient, Star Delgado, to the [facility]. Please see a copy of my visit note below.    FOOT AND ANKLE SURGERY/PODIATRY CONSULT NOTE         ASSESSMENT:   Foreign body right foot      TREATMENT:  I have recommended surgical excision of the foreign body of the right foot.  I informed the patient that these small objects can be very difficult to locate and removed.  I would make a small incision and probed the area and attempt to find a small piece of what is suspected as a piece of porcelain glass.  He was told the procedure will be done on outpatient basis under local anesthesia with IV sedation.  He was told the procedure will allow him to weight-bear following procedure in a postop shoe for 2 to 3 weeks.  The patient was told there is a chance that I would not be able to locate this foreign body.  He was asked to go n.p.o. at midnight prior to the procedure and he was asked to see his primary care physician for preoperative consultation.        HPI: I was asked to see Star Delgado today complaining of a piece of glass remaining on the bottom of his right foot.  The patient stated that several weeks ago he stepped on a broken shard of porcelain glass.  He had some significant pain and bleeding.  He went to the ED.  They made an attempt to remove the foreign body.  They were unsuccessful.  The patient stated that he continues to have a very sharp pain with weightbearing and ambulation.  He has not had any redness, swelling, drainage or bleeding prior to his ED visit.  He has no pain while resting..  The patient was seen in consultation at the request of Patsy Flores MD for evaluation and treatment of right foot pain.     Past Medical History:   Diagnosis Date     Arm skin lesion, left      Leg skin lesion, left      Psoriasis vulgaris 08/22/2022      Puncture wound 05/03/2024     Unspecified acute reaction to stress 12/16/2014    Replacement Utility updated for latest IMO load            Social History     Socioeconomic History     Marital status:      Spouse name: Not on file     Number of children: Not on file     Years of education: Not on file     Highest education level: Not on file   Occupational History     Not on file   Tobacco Use     Smoking status: Never     Smokeless tobacco: Never   Substance and Sexual Activity     Alcohol use: Yes     Alcohol/week: 1.0 - 2.0 standard drink of alcohol     Drug use: No     Sexual activity: Yes     Partners: Female   Other Topics Concern     Not on file   Social History Narrative    , Susan.  Two children 2012 and 2014.  Works as  for [employer].       Social Determinants of Health     Financial Resource Strain: Not on file   Food Insecurity: Not on file   Transportation Needs: Not on file   Physical Activity: Not on file   Stress: Not on file   Social Connections: Not on file   Interpersonal Safety: Low Risk  (6/3/2024)    Interpersonal Safety      Do you feel physically and emotionally safe where you currently live?: Yes      Within the past 12 months, have you been hit, slapped, kicked or otherwise physically hurt by someone?: Not on file      Within the past 12 months, have you been humiliated or emotionally abused in other ways by your partner or ex-partner?: No   Housing Stability: Not on file        No Known Allergies       Current Outpatient Medications:      levofloxacin (LEVAQUIN) 500 MG tablet, , Disp: , Rfl:      augmented betamethasone dipropionate (DIPROLENE-AF) 0.05 % external ointment, Apply topically 2 times daily (Patient taking differently: Apply topically as needed), Disp: 50 g, Rfl: 3     calcipotriene (DOVONOX) 0.005 % external ointment, Apply topically 2 times daily (Patient taking differently: Apply topically as needed), Disp: 90 g, Rfl: 3     Family  History   Problem Relation Age of Onset     No Known Problems Sister      No Known Problems Brother         Social History     Socioeconomic History     Marital status:      Spouse name: Not on file     Number of children: Not on file     Years of education: Not on file     Highest education level: Not on file   Occupational History     Not on file   Tobacco Use     Smoking status: Never     Smokeless tobacco: Never   Substance and Sexual Activity     Alcohol use: Yes     Alcohol/week: 1.0 - 2.0 standard drink of alcohol     Drug use: No     Sexual activity: Yes     Partners: Female   Other Topics Concern     Not on file   Social History Narrative    , Susan.  Two children 2012 and 2014.  Works as  for [employer].       Social Determinants of Health     Financial Resource Strain: Not on file   Food Insecurity: Not on file   Transportation Needs: Not on file   Physical Activity: Not on file   Stress: Not on file   Social Connections: Not on file   Interpersonal Safety: Low Risk  (6/3/2024)    Interpersonal Safety      Do you feel physically and emotionally safe where you currently live?: Yes      Within the past 12 months, have you been hit, slapped, kicked or otherwise physically hurt by someone?: Not on file      Within the past 12 months, have you been humiliated or emotionally abused in other ways by your partner or ex-partner?: No   Housing Stability: Not on file        Review of Systems - Patient denies fever, chills, rash, wound, stiffness, numbness, weakness, heart burn, blood in stool, chest pain with activity, calf pain when walking, shortness of breath with activity, chronic cough, easy bleeding/bruising, swelling of ankles, excessive thirst, fatigue, depression, anxiety.  Patient admits to right foot pain.      OBJECTIVE:  Appearance: alert, well appearing, and in no distress.    /70   Pulse 61   Resp 18   SpO2 97%      There is no height or weight on file to  calculate BMI.     General appearance: Patient is alert and fully cooperative with history & exam.  No sign of distress is noted during the visit.  Psychiatric: Affect is pleasant & appropriate.  Patient appears motivated to improve health.  Respiratory: Breathing is regular & unlabored while sitting.  HEENT: Hearing is intact to spoken word.  Speech is clear.  No gross evidence of visual impairment that would impact ambulation.    Vascular: Dorsalis pedis and posterior tibial pulses are palpable. There is pedal hair growth bilaterally.  CFT < 3 sec from anterior tibial surface to distal digits bilaterally. There is no appreciable edema noted.  Dermatologic: Turgor and texture are within normal limits. No coloration or temperature changes. No primary or secondary lesions noted.  Neurologic: All epicritic and proprioceptive sensations are grossly intact bilaterally.  Musculoskeletal: All active and passive ankle, subtalar, midtarsal, and 1st MPJ range of motion are grossly intact without pain or crepitus. Manual muscle strength is within normal limits bilaterally. All dorsiflexors, plantarflexors, invertors, evertors are intact bilaterally. Tenderness present to subsecond metatarsal head right foot on palpation.  Mild tenderness to subsecond metatarsal head right foot with range of motion. Calf is soft/non-tender without warmth/induration    Imaging:       No images are attached to the encounter or orders placed in the encounter.     XR Foot Right G/E 3 Views    Result Date: 6/3/2024  EXAM: XR FOOT RIGHT G/E 3 VIEWS LOCATION: [facility] DATE: 6/3/2024 INDICATION: Check for foreign body right plantar forefoot. COMPARISON: 5/10/2024.     IMPRESSION: On the AP view there is a small triangular hyperdense foreign body projecting between the second and third metatarsal necks measuring 3 mm, residing in the plantar soft tissues as seen on the lateral view. No evidence of an acute fracture. Joint  spaces are maintained. Distal Achilles tendon enthesopathy. Calcaneal heel spur. NOTE: ABNORMAL REPORT THE DICTATION ABOVE DESCRIBES AN ABNORMALITY FOR WHICH FOLLOW-UP IS NEEDED.        XR Foot Right G/E 3 Views    Result Date: 5/10/2024  EXAM: XR FOOT RT AP/MO/LAT LOCATION: [facility] DATE: 5/10/2024 INDICATION: Foreign body, PAIN COMPARISON: None. IMPRESSION:  Multiple views of the right foot. Plantar calcaneal spurring. Secondary ossification centers adjacent to the navicular and cuboid bones. Tiny radiopacity in the plantar soft tissues at the level of the metatarsophalangeal joints on the lateral view. Clinical correlation for radiopaque foreign body. Otherwise, The bones of the right foot appear normal.  There is no evidence of fracture or dislocation. Otherwise the soft tissues are well preserved.     XR Foot Right G/E 3 Views    Result Date: 6/3/2024  EXAM: XR FOOT RIGHT G/E 3 VIEWS LOCATION: [facility] DATE: 6/3/2024 INDICATION: Check for foreign body right plantar forefoot. COMPARISON: 5/10/2024.     IMPRESSION: On the AP view there is a small triangular hyperdense foreign body projecting between the second and third metatarsal necks measuring 3 mm, residing in the plantar soft tissues as seen on the lateral view. No evidence of an acute fracture. Joint spaces are maintained. Distal Achilles tendon enthesopathy. Calcaneal heel spur. NOTE: ABNORMAL REPORT THE DICTATION ABOVE DESCRIBES AN ABNORMALITY FOR WHICH FOLLOW-UP IS NEEDED.        XR Foot Right G/E 3 Views    Result Date: 5/10/2024  EXAM: XR FOOT RT AP/MO/LAT LOCATION: [facility] DATE: 5/10/2024 INDICATION: Foreign body, PAIN COMPARISON: None. IMPRESSION:  Multiple views of the right foot. Plantar calcaneal spurring. Secondary ossification centers adjacent to the navicular and cuboid bones. Tiny radiopacity in the plantar soft tissues at the level of the metatarsophalangeal joints on the lateral view. Clinical  correlation for radiopaque foreign body. Otherwise, The bones of the right foot appear normal.  There is no evidence of fracture or dislocation. Otherwise the soft tissues are well preserved.             Tee Jalloh DPM  [facility] Foot & Ankle Surgery/Podiatry         Again, thank you for allowing me to participate in the care of your patient.        Sincerely,        Tee Willis DPM

## 2024-06-05 NOTE — PROGRESS NOTES
FOOT AND ANKLE SURGERY/PODIATRY CONSULT NOTE         ASSESSMENT:   Foreign body right foot      TREATMENT:  I have recommended surgical excision of the foreign body of the right foot.  I informed the patient that these small objects can be very difficult to locate and removed.  I would make a small incision and probed the area and attempt to find a small piece of what is suspected as a piece of porcelain glass.  He was told the procedure will be done on outpatient basis under local anesthesia with IV sedation.  He was told the procedure will allow him to weight-bear following procedure in a postop shoe for 2 to 3 weeks.  The patient was told there is a chance that I would not be able to locate this foreign body.  He was asked to go n.p.o. at midnight prior to the procedure and he was asked to see his primary care physician for preoperative consultation.        HPI: I was asked to see Star Delgado today complaining of a piece of glass remaining on the bottom of his right foot.  The patient stated that several weeks ago he stepped on a broken shard of porcelain glass.  He had some significant pain and bleeding.  He went to the ED.  They made an attempt to remove the foreign body.  They were unsuccessful.  The patient stated that he continues to have a very sharp pain with weightbearing and ambulation.  He has not had any redness, swelling, drainage or bleeding prior to his ED visit.  He has no pain while resting..  The patient was seen in consultation at the request of Patsy Flores MD for evaluation and treatment of right foot pain.     Past Medical History:   Diagnosis Date    Arm skin lesion, left     Leg skin lesion, left     Psoriasis vulgaris 08/22/2022    Puncture wound 05/03/2024    Unspecified acute reaction to stress 12/16/2014    Replacement Utility updated for latest IMO load            Social History     Socioeconomic History    Marital status:      Spouse name: Not on file    Number of  children: Not on file    Years of education: Not on file    Highest education level: Not on file   Occupational History    Not on file   Tobacco Use    Smoking status: Never    Smokeless tobacco: Never   Substance and Sexual Activity    Alcohol use: Yes     Alcohol/week: 1.0 - 2.0 standard drink of alcohol    Drug use: No    Sexual activity: Yes     Partners: Female   Other Topics Concern    Not on file   Social History Narrative    , Susan.  Two children 2012 and 2014.  Works as  for [employer].       Social Determinants of Health     Financial Resource Strain: Not on file   Food Insecurity: Not on file   Transportation Needs: Not on file   Physical Activity: Not on file   Stress: Not on file   Social Connections: Not on file   Interpersonal Safety: Low Risk  (6/3/2024)    Interpersonal Safety     Do you feel physically and emotionally safe where you currently live?: Yes     Within the past 12 months, have you been hit, slapped, kicked or otherwise physically hurt by someone?: Not on file     Within the past 12 months, have you been humiliated or emotionally abused in other ways by your partner or ex-partner?: No   Housing Stability: Not on file        No Known Allergies       Current Outpatient Medications:     levofloxacin (LEVAQUIN) 500 MG tablet, , Disp: , Rfl:     augmented betamethasone dipropionate (DIPROLENE-AF) 0.05 % external ointment, Apply topically 2 times daily (Patient taking differently: Apply topically as needed), Disp: 50 g, Rfl: 3    calcipotriene (DOVONOX) 0.005 % external ointment, Apply topically 2 times daily (Patient taking differently: Apply topically as needed), Disp: 90 g, Rfl: 3     Family History   Problem Relation Age of Onset    No Known Problems Sister     No Known Problems Brother         Social History     Socioeconomic History    Marital status:      Spouse name: Not on file    Number of children: Not on file    Years of education: Not on file     Highest education level: Not on file   Occupational History    Not on file   Tobacco Use    Smoking status: Never    Smokeless tobacco: Never   Substance and Sexual Activity    Alcohol use: Yes     Alcohol/week: 1.0 - 2.0 standard drink of alcohol    Drug use: No    Sexual activity: Yes     Partners: Female   Other Topics Concern    Not on file   Social History Narrative    , Susan.  Two children 2012 and 2014.  Works as  for [employer].       Social Determinants of Health     Financial Resource Strain: Not on file   Food Insecurity: Not on file   Transportation Needs: Not on file   Physical Activity: Not on file   Stress: Not on file   Social Connections: Not on file   Interpersonal Safety: Low Risk  (6/3/2024)    Interpersonal Safety     Do you feel physically and emotionally safe where you currently live?: Yes     Within the past 12 months, have you been hit, slapped, kicked or otherwise physically hurt by someone?: Not on file     Within the past 12 months, have you been humiliated or emotionally abused in other ways by your partner or ex-partner?: No   Housing Stability: Not on file        Review of Systems - Patient denies fever, chills, rash, wound, stiffness, numbness, weakness, heart burn, blood in stool, chest pain with activity, calf pain when walking, shortness of breath with activity, chronic cough, easy bleeding/bruising, swelling of ankles, excessive thirst, fatigue, depression, anxiety.  Patient admits to right foot pain.      OBJECTIVE:  Appearance: alert, well appearing, and in no distress.    /70   Pulse 61   Resp 18   SpO2 97%      There is no height or weight on file to calculate BMI.     General appearance: Patient is alert and fully cooperative with history & exam.  No sign of distress is noted during the visit.  Psychiatric: Affect is pleasant & appropriate.  Patient appears motivated to improve health.  Respiratory: Breathing is regular & unlabored while  sitting.  HEENT: Hearing is intact to spoken word.  Speech is clear.  No gross evidence of visual impairment that would impact ambulation.    Vascular: Dorsalis pedis and posterior tibial pulses are palpable. There is pedal hair growth bilaterally.  CFT < 3 sec from anterior tibial surface to distal digits bilaterally. There is no appreciable edema noted.  Dermatologic: Turgor and texture are within normal limits. No coloration or temperature changes. No primary or secondary lesions noted.  Neurologic: All epicritic and proprioceptive sensations are grossly intact bilaterally.  Musculoskeletal: All active and passive ankle, subtalar, midtarsal, and 1st MPJ range of motion are grossly intact without pain or crepitus. Manual muscle strength is within normal limits bilaterally. All dorsiflexors, plantarflexors, invertors, evertors are intact bilaterally. Tenderness present to subsecond metatarsal head right foot on palpation.  Mild tenderness to subsecond metatarsal head right foot with range of motion. Calf is soft/non-tender without warmth/induration    Imaging:       No images are attached to the encounter or orders placed in the encounter.     XR Foot Right G/E 3 Views    Result Date: 6/3/2024  EXAM: XR FOOT RIGHT G/E 3 VIEWS LOCATION: [facility] DATE: 6/3/2024 INDICATION: Check for foreign body right plantar forefoot. COMPARISON: 5/10/2024.     IMPRESSION: On the AP view there is a small triangular hyperdense foreign body projecting between the second and third metatarsal necks measuring 3 mm, residing in the plantar soft tissues as seen on the lateral view. No evidence of an acute fracture. Joint spaces are maintained. Distal Achilles tendon enthesopathy. Calcaneal heel spur. NOTE: ABNORMAL REPORT THE DICTATION ABOVE DESCRIBES AN ABNORMALITY FOR WHICH FOLLOW-UP IS NEEDED.        XR Foot Right G/E 3 Views    Result Date: 5/10/2024  EXAM: XR FOOT RT AP/MO/LAT LOCATION: [facility]  DATE: 5/10/2024 INDICATION: Foreign body, PAIN COMPARISON: None. IMPRESSION:  Multiple views of the right foot. Plantar calcaneal spurring. Secondary ossification centers adjacent to the navicular and cuboid bones. Tiny radiopacity in the plantar soft tissues at the level of the metatarsophalangeal joints on the lateral view. Clinical correlation for radiopaque foreign body. Otherwise, The bones of the right foot appear normal.  There is no evidence of fracture or dislocation. Otherwise the soft tissues are well preserved.     XR Foot Right G/E 3 Views    Result Date: 6/3/2024  EXAM: XR FOOT RIGHT G/E 3 VIEWS LOCATION: [facility] DATE: 6/3/2024 INDICATION: Check for foreign body right plantar forefoot. COMPARISON: 5/10/2024.     IMPRESSION: On the AP view there is a small triangular hyperdense foreign body projecting between the second and third metatarsal necks measuring 3 mm, residing in the plantar soft tissues as seen on the lateral view. No evidence of an acute fracture. Joint spaces are maintained. Distal Achilles tendon enthesopathy. Calcaneal heel spur. NOTE: ABNORMAL REPORT THE DICTATION ABOVE DESCRIBES AN ABNORMALITY FOR WHICH FOLLOW-UP IS NEEDED.        XR Foot Right G/E 3 Views    Result Date: 5/10/2024  EXAM: XR FOOT RT AP/MO/LAT LOCATION: [facility] DATE: 5/10/2024 INDICATION: Foreign body, PAIN COMPARISON: None. IMPRESSION:  Multiple views of the right foot. Plantar calcaneal spurring. Secondary ossification centers adjacent to the navicular and cuboid bones. Tiny radiopacity in the plantar soft tissues at the level of the metatarsophalangeal joints on the lateral view. Clinical correlation for radiopaque foreign body. Otherwise, The bones of the right foot appear normal.  There is no evidence of fracture or dislocation. Otherwise the soft tissues are well preserved.             Tee Willis; BERT  [facility] Foot & Ankle Surgery/Podiatry

## 2024-06-05 NOTE — PATIENT INSTRUCTIONS
Star,      Your surgery with [facility] has been ordered. A  will contact you in the next few days to schedule. Or feel free to call [phone] to schedule sooner.     Procedure:   Removal of foreign body right foot     Procedure Date :     *A surgery nurse will call you 1-2 days before surgery to inform you of the time of arrival for surgery.    Surgeon:   Dr. Tee Willis    Admission Type:   Outpatient    Procedure Location:   [facility]:  [address] [place] [address] [address] [place] [address] (Fax: [phone])    Post Operative Appointment:       Preparation Instructions to complete:    []  You will need a Pre-op Physical within 30 days before surgery with your primary care provider. This exam is required by the anesthesiologist to ensure a safe surgical experience.    Failure  to obtain your pre-op physical will lead to cancellation of your surgery  Call them right away to schedule this. Please ensure your Preoperative Physical is faxed to the Hospital (numbers listed above)    [] Preoperative Medication Instructions  We would like you to stop your anticoagulation medications 3-5 days before surgery HOWEVER contact your prescribing provider for instructions before discontinuing any medications. (Examples: Coumadin, Plavix, Xarelto, Eliquis, Pradaxa, Effient, Brilinta) If you are on Coumadin, we would like the goal INR ? 1.2.  IT IS OK TO STAY ON ASPIRIN PRIOR TO SURGERY.   Hold Ibuprofen, Herbal Supplements and Vitamin E 7 days before  Stop Cialis, Levitra and Viagra 2-3 days before surgery  If you take these diabetic medications, please discuss with your primary doctor and follow the hold instructions:   Hold seven (7) days prior for once weekly injectable doses [semaglutide (Ozempic, Wegovy), dulaglutide (Trulicity), exenatide ER (Bydureon), tirzepatide (Mounjaro)]  Hold the day before and day of for once daily injectable GLP-1 agonists [exenatide (Byetta),  "liraglutide (Saxenda, Victoza)]  Hold seven (7) days for oral semaglutide (Rybelsus)     [] Fasting Requirements  Nothing to eat for 8 hours before surgery unless instructed differently by the surgery nurse.  You may have clear liquids up to two hours before your arrival time (coffee, tea, water, or Gatorade. No cream or milk)  If your primary care provider has instructed you to continue oral medications, you may take them on the morning of surgery with a small sip of water.    No alcohol or smoking after 12:00am the day of surgery    [] Contact your insurance regarding coverage  If you would like a Good Sonal Estimate for your upcoming procedure at [facility] Location, contact Cost of Care Estimates   Advocates are available be phone Monday through Friday 9am - 3pm   at [phone]  You may also submit a request online at [email or web address] and select the \"Submit Pricing Inquiry\" link     For all self-pay, estimate, or anesthesia billing questions at [facility], the contact information is below:    Who to contact: Berkley [place]  [facility] number: [phone]  [employer] number: [phone]    The billing office at the [facility] will contact you with the facility charge estimate but you may also reach them at [phone] with questions.    [] DO NOT BRING FMLA WITH TO SURGERY.  These should be sent to the provider's office by fax to [phone].    [] Day of Surgery  Medications - Take as indicated with sips of water.   Wear comfortable loose fitting clothes. Wear your glasses-Not contacts. Do not wear jewelry and remove body piercing's. Surgery may be cancelled if they are not removed.   You may have 1 family member wait in the lobby during your surgery. Visitor restrictions are subject to change. Please verify with the surgery nurse when they call.   If same day surgery-Have a someone come with you to surgery that can help you understand " the surgeon's instructions, drive you home and stay with you overnight the first night.    [] You will receive a call from a surgery nurse 1-3 days prior to surgery. They will go over more details with you.         Frequently Asked Questions    WHAT DO I DO WHEN I COME HOME FROM SURGERY?    After surgery and/ or your hospital stay you may be tired and drowsy. Rest, but make sure to get up and walk around every couple of hours to circulate your blood. If you are non-weight bearing do not put any weight on your foot.  Be sure to take your medications as directed. Try to get a restful sleep and begin more normal activities as tolerated.    HOW MUCH PAIN SHOULD I EXPECT AND WILL I HAVE PAIN MEDICATION?    Everyone tolerates pain differently. Still, each type of surgery involves a certain level and type of pain. Generally most people feel better within a few days but keep in mind that everyone has a different tolerance to pain. All medications should be taken as directed. All medications can have side effects such as bleeding, upset stomach, headaches, dizziness, constipation, etc. If you have any major problems or allergic reaction, stop the medication and call the office. If you only have a little pain, you may simply take Tylenol or Ibuprofen as directed on the label.     WHAT ABOUT MY DRESSING AND WHEN DO I COME BACK TO THE OFFICE?    The outer dressing stays in place for 7 days and when you come in for your first post-op we will remove it.  Some patients may have staples, zipper or sutures; these stay in for 10-14 days and will be removed at your 2nd post-op visit. After 24 hours you may shower using shower precautions.    WHAT ABOUT SWELLING, REDNESS, BRUISING OR DRAINAGE?    It is normal to have some swelling, and bruising after surgery. It gradually subsides but may be present for several weeks. Elevation and icing will help to reduce the swelling more rapidly.  If your bandage is really tight after 24 hours you  may cut the bandage an inch by the toes or under your foot and by your ankle.  Ice therapy often works better than oral pain medication. Using cold therapy is recommended every hour for 20 minutes.    WHEN CAN I TAKE A BATH?    You can take a bath as long as the wound has no drainage and is fully healed without a scab. This generally takes about 2 weeks.  Unless you get the bandaged protector from above then you can take a shower when you feel up to it.    WHEN CAN I RETURN TO WORK?    You may return to work to an office-type job whenever you feel comfortable enough. The only restriction would be your own motivation and pain tolerance. If your job requires vigorous activities you may be off 1-4 weeks which is determined by what surgery you have and your operating physician.     WHAT ABOUT DRIVING OR FLYING?    As a general rule, you may resume driving as soon as you are comfortable and fully alert and off narcotic pain medications. If you are traveling by plane within 4 weeks of surgery, perform range of motion exercises of the legs and feet during the flight. Get up and walk around frequently to prevent blood clots.      WHEN CAN I EXERSICE?    You may return to normal activities such as exercising when your surgeon tells you usually 2 weeks. Walking, sitting, standing and going up the stairs are all fine after the 2-week morales. You may have restrictions for 1-4 weeks of no lifting, pushing, pulling in excess of 20 lbs. This is determined by what surgery you have and your surgeon.    WILL I BECOME ADDICTED TO MY PAIN MEDICATION?    Pain medications are safe and effective when used as directed. However, misuse of these products can be extremely harmful and even deadly. Pain medications must be taken only as directed by your surgeon. Do not change the dose of your pain medication without talking to your operating physician first.    POSTOPERATIVE INFORMATION: MANAGING PAIN  Measuring Your Pain    A pain scale helps  you rate pain intensity. In the scale, 0 means no pain, and 10 is the worst pain possible.  You should rate your pain every 4-6 hours. You may feel some pain even with medications. It is important to tell your health care provider if medications don't reduce the pain.        Managing Post-Op Pain at Home: Medications    Pain after an operation (post-op pain) is common and expected. These guidelines can help you stay as comfortable as possible.    Taking Pain Medications    Take any prescribed pain medications as directed by your doctor.  Take pain medications with some food to avoid an upset stomach.  Be aware that narcotic pain medications cause constipation. We recommend taking Milk of Magnesia   Eating high-fiber foods and drink plenty of water.  Don t drink alcohol while using pain medications.  Possible side effects include stomach upset, nausea, and itching.    Alternating Ibuprofen with your pain medication    Ibuprofen has three actions: it reduces fever, relieves pain and fights inflammation. Alternate between your prescribed pain medication and Ibuprofen every three hours (i.e., take a dose of Ibuprofen then three hours later take your prescribed pain medication then three hours later Ibuprofen, ect.) These two medications are safe to use together like this.    POSTOPERATIVE INFORMATION: CONSTIPATION    Constipation after surgery is a common problem and is often related to taking post-operative narcotic pain medication. Signs that you may be constipated include bloating, abdominal distention and/or pain.    Constipation Prevention & Treatment    Drink plenty of fluids! This is the most important thing you can do to help prevent constipation.  Increase physical activity as recommended by your surgeon.  Consume a high fiber diet. We recommend introducing high fiber foods pre-operatively. Some foods high in fiber include:    Oatmeal Bran  Flaxseed Dried Fruit    Carrots   Bananas Strawberries Oranges Whole  Grain Bread     Bananas Prunes  Pears  Raspberries     Over the counter medication/supplements - in order of recommended treatment:   (Be sure to follow instructions on product packaging)    Fiber supplement   Bulk forming laxative - Can be used to prevent constipation  Great food sources of fiber include but are not limited to:  Colace (docusate sodium)  Osmotic stool softener - Can be used 2-3 times per day to prevent constipation  Milk of Magnesia  MIRALAX  Enema/Suppository    Patient can also  some probiotics such as Culturelle to help prevent Antibiotic associated diarrhea. They can take this 1 hour before or 2 hours after taking their antibiotic should not be taken at the same time as they can cancel out the effects.                          ** AFTER SURGERY INSTRUCTIONS **                          [] You are to remain LIMITED WEIGHT BEARING on your right foot Limited weight bearing means that it is only okay for you to apply light pressure on the affected foot when transferring from your assistive device to a chair or bed.    [] During surgery   will apply a gauze dressing to your foot. This will remain intact until you are seen in clinic for follow up    [] It is NOT OKAY to get your surgical site wet while bathing, you will need to purchase a cast cover to protect your foot from getting wet. You can purchase this at [facility] or your local pharmacy.    [] It is important that you elevate your affected foot after surgery. Proper elevation is raising your foot above your waist. The fluid in your lower extremities needs to get back to your heart so it can get pumped to your kidneys and expelled through urination. So if you notice you have to go to the bathroom more frequently when you are elevating your leg this is a good sign that it is working.     [] It is important that you increase your protein intake after surgery. Protein is essential for wound healing. We recommend you take  a protein supplement twice per day. This is in addition to your normal diet. Examples of protein supplements are Ensure, Boost, Glucerna (if you are diabetic), or protein powder. You can purchase these at your local retailer or grocery store.       Notify our office right away, if you have any changes in your health status, or if you develop a cold, flu, diarrhea, infection, fever or sore throat before your scheduled surgery date. We can be reached at [phone]   Monday-Friday 8 am - 4:30 pm if you have any questions.     Thank you for trusting us with your care!      For informational purposes only. Not to replace the advice of your health care provider. Copyright   2020 [facility]. All rights reserved. Clinically reviewed by Infection Prevention and the [facility] COVID-19 Clinical Team. [employer] 384227 - Rev 09/09/21.

## 2024-06-06 ENCOUNTER — TELEPHONE (OUTPATIENT)
Dept: PODIATRY | Facility: CLINIC | Age: 49
End: 2024-06-06
Payer: COMMERCIAL

## 2024-06-06 PROBLEM — M60.271: Status: ACTIVE | Noted: 2024-06-05

## 2024-06-06 NOTE — TELEPHONE ENCOUNTER
Surgery scheduled with Dr. Willis at [facility] on 06/14/2024 at 10:30am.    Removal of foreign body, right foot  CPT Code 58539    Email sent to Melony.  Added to [place].    Pre-op physical 06/10/2024.    Post ops scheduled.  Summary sent via [employer].

## 2024-06-10 ENCOUNTER — OFFICE VISIT (OUTPATIENT)
Dept: FAMILY MEDICINE | Facility: CLINIC | Age: 49
End: 2024-06-10
Payer: COMMERCIAL

## 2024-06-10 VITALS
WEIGHT: 208 LBS | RESPIRATION RATE: 18 BRPM | HEART RATE: 72 BPM | BODY MASS INDEX: 30.81 KG/M2 | TEMPERATURE: 97.9 F | HEIGHT: 69 IN | DIASTOLIC BLOOD PRESSURE: 89 MMHG | SYSTOLIC BLOOD PRESSURE: 133 MMHG | OXYGEN SATURATION: 97 %

## 2024-06-10 DIAGNOSIS — Z01.818 PREOP EXAMINATION: Primary | ICD-10-CM

## 2024-06-10 DIAGNOSIS — M60.271 FOREIGN BODY GRANULOMA OF SOFT TISSUE OF RIGHT FOOT: ICD-10-CM

## 2024-06-10 DIAGNOSIS — L40.0 PSORIASIS VULGARIS: ICD-10-CM

## 2024-06-10 PROCEDURE — 99214 OFFICE O/P EST MOD 30 MIN: CPT | Performed by: FAMILY MEDICINE

## 2024-06-10 ASSESSMENT — ENCOUNTER SYMPTOMS
ABDOMINAL PAIN: 0
BACK PAIN: 0
COUGH: 0
SINUS PRESSURE: 0
HEADACHES: 0
WHEEZING: 0
NUMBNESS: 0
SORE THROAT: 0
WOUND: 1
BRUISES/BLEEDS EASILY: 0
WEAKNESS: 0
PALPITATIONS: 0
CONSTIPATION: 0
NAUSEA: 0
SINUS PAIN: 0
DIARRHEA: 0
PSYCHIATRIC NEGATIVE: 1
ARTHRALGIAS: 0
SHORTNESS OF BREATH: 0
CHILLS: 0
VOMITING: 0
FEVER: 0

## 2024-06-10 NOTE — PROGRESS NOTES
Preoperative Evaluation  [facility] [address] [address] [place] [address]  Phone: [phone]  Fax: [phone]  Primary Provider: Hossein Friend MD  Pre-op Performing Provider: ALFREDO RASHID MD  Damian 10, 2024             6/6/2024   Surgical Information   What procedure is being done? remove foreign body (piece of ceramic) from my right foot   Facility or Hospital where procedure/surgery will be performed: [facility]   Who is doing the procedure / surgery? Dr. Willis   Date of surgery / procedure: June 14   Time of surgery / procedure: Morning   Where do you plan to recover after surgery? at home with family     Fax number for surgical facility: Note does not need to be faxed, will be available electronically in Epic.    Assessment & Plan     The proposed surgical procedure is considered LOW risk.    Problem List Items Addressed This Visit       Psoriasis vulgaris    Foreign body granuloma of soft tissue of right foot     Other Visit Diagnoses       Preop examination    -  Primary          This is a 49 yo male who reports stepping on some broken ceramic pieces/shards.  This pierced his shoe and he suffered a puncture wound of the right foot.  There is still a granuloma of the right foot - and he has tenderness associated with retained foreign body.  He is now scheduled for operative excision of that foreign body.      Patient is generally healthy - active/regular exerciser - no significant medical conditions that would be contraindications to, or increase the risk of surgery.  He does have extensive psoriasis.  Uses an OTC cream.              - No identified additional risk factors other than previously addressed    Antiplatelet or Anticoagulation Medication Instructions   - Patient is on no antiplatelet or anticoagulation medications.    Additional Medication Instructions  Patient is on no additional chronic medications    Recommendation  Approval given to  proceed with proposed procedure, without further diagnostic evaluation.        Demarco Graves is a 48 year old, presenting for the following:  Pre-Op Exam          6/10/2024     3:12 PM   Additional Questions   Roomed by Verónica DELGADO related to upcoming procedure: stepped on some broken ceramic in his alley - went through his shoes and into foot; largely healed and had bubbled up; monitored it -  when steps right on it;    Even jogged a little the other day, but still has some lump from this in bottom of foot  Up to date on tetanus          6/6/2024   Pre-Op Questionnaire   Have you ever had a heart attack or stroke? No   Have you ever had surgery on your heart or blood vessels, such as a stent placement, a coronary artery bypass, or surgery on an artery in your head, neck, heart, or legs? No   Do you have chest pain with activity? No   Do you have a history of heart failure? No   Do you currently have a cold, bronchitis or symptoms of other infection? No   Do you have a cough, shortness of breath, or wheezing? No   Do you or anyone in your family have previous history of blood clots? No   Do you or does anyone in your family have a serious bleeding problem such as prolonged bleeding following surgeries or cuts? No   Have you ever had problems with anemia or been told to take iron pills? No   Have you had any abnormal blood loss such as black, tarry or bloody stools? No   Have you ever had a blood transfusion? No   Are you willing to have a blood transfusion if it is medically needed before, during, or after your surgery? Yes   Have you or any of your relatives ever had problems with anesthesia? No   Do you have sleep apnea, excessive snoring or daytime drowsiness? No   Do you have any artifical heart valves or other implanted medical devices like a pacemaker, defibrillator, or continuous glucose monitor? No   Do you have artificial joints? No   Are you allergic to latex? No     Health Care  Directive  Patient does not have a Health Care Directive or Living Will: no written documents     Preoperative Review of    reviewed - no record of controlled substances prescribed.      Status of Chronic Conditions:  See problem list for active medical problems.  Problems all longstanding and stable, except as noted/documented.  See ROS for pertinent symptoms related to these conditions.    Has longstanding psoriasis - uses OTC for legs    Has had ingrown hair and had some surgical procedure for that - no other surgeries      Patient Active Problem List    Diagnosis Date Noted    Foreign body granuloma of soft tissue of right foot 06/05/2024     Priority: Medium    Puncture wound 05/03/2024     Priority: Medium    Psoriasis vulgaris 08/22/2022     Priority: Medium    Unspecified acute reaction to stress 12/16/2014     Priority: Medium     Replacement Utility updated for latest IMO load          Past Medical History:   Diagnosis Date    Arm skin lesion, left     Leg skin lesion, left     Psoriasis vulgaris 08/22/2022    Puncture wound 05/03/2024    Unspecified acute reaction to stress 12/16/2014    Replacement Utility updated for latest IMO load          Past Surgical History:   Procedure Laterality Date    EXCISE LESION LOWER EXTREMITY Left 10/27/2017    Procedure: EXCISION LESION LEFT THIGH;  Surgeon: Tristin Ramirez MD;  Location: [facility];  Service:     EXCISE LESION UPPER EXTREMITY Left 10/27/2017    Procedure: EXCISION LESION LEFT ARM;  Surgeon: Tristin Ramirez MD;  Location: [facility];  Service:     HERNIA REPAIR      as a child    [place] LAP,HERNIA REPAIR PROC,UNLIST      Description: Laparoscopy Repair Of Hernia;  Recorded: 07/06/2011;    VASECTOMY       No current outpatient medications on file.       No Known Allergies     Social History     Tobacco Use    Smoking status: Never    Smokeless tobacco: Never   Substance Use Topics    Alcohol use: Yes     Alcohol/week: 1.0 - 2.0  "standard drink of alcohol     Family History   Problem Relation Age of Onset    No Known Problems Sister     No Known Problems Brother      History   Drug Use No           Review of Systems   Constitutional:  Negative for chills and fever.   HENT:  Negative for congestion, sinus pressure, sinus pain and sore throat.    Eyes:  Negative for visual disturbance.   Respiratory:  Negative for cough, shortness of breath and wheezing.    Cardiovascular:  Negative for chest pain, palpitations and leg swelling.   Gastrointestinal:  Negative for abdominal pain, constipation, diarrhea, nausea and vomiting.   Endocrine: Negative for cold intolerance and heat intolerance.   Genitourinary: Negative.    Musculoskeletal:  Negative for arthralgias and back pain.   Skin:  Positive for wound (right foot - foreign body).   Allergic/Immunologic: Positive for environmental allergies (minor pollen allergies). Negative for food allergies.   Neurological:  Negative for weakness, numbness and headaches.   Hematological:  Does not bruise/bleed easily.   Psychiatric/Behavioral: Negative.     All other systems reviewed and are negative.        Objective    /89 (BP Location: Right arm, Patient Position: Sitting, Cuff Size: Adult Regular)   Pulse 72   Temp 97.9  F (36.6  C) (Temporal)   Resp 18   Ht 1.76 m (5' 9.29\")   Wt 94.3 kg (208 lb)   SpO2 97%   BMI 30.46 kg/m     Estimated body mass index is 30.46 kg/m  as calculated from the following:    Height as of this encounter: 1.76 m (5' 9.29\").    Weight as of this encounter: 94.3 kg (208 lb).  Physical Exam  Vitals reviewed.   Constitutional:       General: He is not in acute distress.     Appearance: Normal appearance.   HENT:      Head: Normocephalic.      Right Ear: Tympanic membrane, ear canal and external ear normal.      Left Ear: Tympanic membrane, ear canal and external ear normal.      Nose: Nose normal.      Mouth/Throat:      Mouth: Mucous membranes are moist.      Pharynx: " "No posterior oropharyngeal erythema.   Eyes:      Extraocular Movements: Extraocular movements intact.      Conjunctiva/sclera: Conjunctivae normal.      Pupils: Pupils are equal, round, and reactive to light.   Cardiovascular:      Rate and Rhythm: Normal rate and regular rhythm.      Pulses: Normal pulses.      Heart sounds: Normal heart sounds. No murmur heard.  Pulmonary:      Effort: Pulmonary effort is normal.      Breath sounds: Normal breath sounds.   Abdominal:      Palpations: Abdomen is soft. There is no mass.      Tenderness: There is no abdominal tenderness. There is no guarding or rebound.   Musculoskeletal:         General: No deformity. Normal range of motion.      Cervical back: Normal range of motion and neck supple.   Lymphadenopathy:      Cervical: No cervical adenopathy.   Skin:     General: Skin is warm and dry.      Comments: Mid plantar MTP joints of right foot - has evident granuloma - palpable subcutaneous foreign body;    Neurological:      General: No focal deficit present.      Mental Status: He is alert and oriented to person, place, and time.   Psychiatric:         Mood and Affect: Mood normal.         Behavior: Behavior normal.           No results for input(s): \"HGB\", \"PLT\", \"INR\", \"NA\", \"POTASSIUM\", \"CR\", \"A1C\" in the last 8760 hours.     Diagnostics  No results found for this or any previous visit (from the past 168 hour(s)).   No EKG required for low risk surgery (cataract, skin procedure, breast biopsy, etc).    Revised Cardiac Risk Index (RCRI)  The patient has the following serious cardiovascular risks for perioperative complications:   - No serious cardiac risks = 0 points     RCRI Interpretation: 0 points: Class I (very low risk - 0.4% complication rate)         Signed Electronically by: ALFREDO RASHID MD  Copy of this evaluation report is provided to requesting physician.           Prior to immunization administration, verified patients identity using " patient s name and date of birth. Please see Immunization Activity for additional information.     Screening Questionnaire for Adult Immunization    Are you sick today?   No   Do you have allergies to medications, food, a vaccine component or latex?   No   Have you ever had a serious reaction after receiving a vaccination?   No   Do you have a long-term health problem with heart, lung, kidney, or metabolic disease (e.g., diabetes), asthma, a blood disorder, no spleen, complement component deficiency, a cochlear implant, or a spinal fluid leak?  Are you on long-term aspirin therapy?   No   Do you have cancer, leukemia, HIV/AIDS, or any other immune system problem?   No   Do you have a parent, brother, or sister with an immune system problem?   No   In the past 3 months, have you taken medications that affect  your immune system, such as prednisone, other steroids, or anticancer drugs; drugs for the treatment of rheumatoid arthritis, Crohn s disease, or psoriasis; or have you had radiation treatments?   No   Have you had a seizure, or a brain or other nervous system problem?   No   During the past year, have you received a transfusion of blood or blood    products, or been given immune (gamma) globulin or antiviral drug?   No   For women: Are you pregnant or is there a chance you could become       pregnant during the next month?   No   Have you received any vaccinations in the past 4 weeks?   No     Immunization questionnaire answers were all negative.      Patient instructed to remain in clinic for 15 minutes afterwards, and to report any adverse reactions.     Screening performed by Verónica Estes CMA on 6/10/2024 at 3:15 PM.

## 2024-06-10 NOTE — H&P (VIEW-ONLY)
Preoperative Evaluation  [facility] [address] [address] [place] [address]  Phone: [phone]  Fax: [phone]  Primary Provider: Hossein Friend MD  Pre-op Performing Provider: ALFREDO RASHID MD  Damian 10, 2024             6/6/2024   Surgical Information   What procedure is being done? remove foreign body (piece of ceramic) from my right foot   Facility or Hospital where procedure/surgery will be performed: [facility]   Who is doing the procedure / surgery? Dr. Willis   Date of surgery / procedure: June 14   Time of surgery / procedure: Morning   Where do you plan to recover after surgery? at home with family     Fax number for surgical facility: Note does not need to be faxed, will be available electronically in Epic.    Assessment & Plan     The proposed surgical procedure is considered LOW risk.    Problem List Items Addressed This Visit       Psoriasis vulgaris    Foreign body granuloma of soft tissue of right foot     Other Visit Diagnoses       Preop examination    -  Primary          This is a 49 yo male who reports stepping on some broken ceramic pieces/shards.  This pierced his shoe and he suffered a puncture wound of the right foot.  There is still a granuloma of the right foot - and he has tenderness associated with retained foreign body.  He is now scheduled for operative excision of that foreign body.      Patient is generally healthy - active/regular exerciser - no significant medical conditions that would be contraindications to, or increase the risk of surgery.  He does have extensive psoriasis.  Uses an OTC cream.              - No identified additional risk factors other than previously addressed    Antiplatelet or Anticoagulation Medication Instructions   - Patient is on no antiplatelet or anticoagulation medications.    Additional Medication Instructions  Patient is on no additional chronic medications    Recommendation  Approval given to  proceed with proposed procedure, without further diagnostic evaluation.        Demarco Graves is a 48 year old, presenting for the following:  Pre-Op Exam          6/10/2024     3:12 PM   Additional Questions   Roomed by Verónica DELGADO related to upcoming procedure: stepped on some broken ceramic in his alley - went through his shoes and into foot; largely healed and had bubbled up; monitored it -  when steps right on it;    Even jogged a little the other day, but still has some lump from this in bottom of foot  Up to date on tetanus          6/6/2024   Pre-Op Questionnaire   Have you ever had a heart attack or stroke? No   Have you ever had surgery on your heart or blood vessels, such as a stent placement, a coronary artery bypass, or surgery on an artery in your head, neck, heart, or legs? No   Do you have chest pain with activity? No   Do you have a history of heart failure? No   Do you currently have a cold, bronchitis or symptoms of other infection? No   Do you have a cough, shortness of breath, or wheezing? No   Do you or anyone in your family have previous history of blood clots? No   Do you or does anyone in your family have a serious bleeding problem such as prolonged bleeding following surgeries or cuts? No   Have you ever had problems with anemia or been told to take iron pills? No   Have you had any abnormal blood loss such as black, tarry or bloody stools? No   Have you ever had a blood transfusion? No   Are you willing to have a blood transfusion if it is medically needed before, during, or after your surgery? Yes   Have you or any of your relatives ever had problems with anesthesia? No   Do you have sleep apnea, excessive snoring or daytime drowsiness? No   Do you have any artifical heart valves or other implanted medical devices like a pacemaker, defibrillator, or continuous glucose monitor? No   Do you have artificial joints? No   Are you allergic to latex? No     Health Care  Directive  Patient does not have a Health Care Directive or Living Will: no written documents     Preoperative Review of    reviewed - no record of controlled substances prescribed.      Status of Chronic Conditions:  See problem list for active medical problems.  Problems all longstanding and stable, except as noted/documented.  See ROS for pertinent symptoms related to these conditions.    Has longstanding psoriasis - uses OTC for legs    Has had ingrown hair and had some surgical procedure for that - no other surgeries      Patient Active Problem List    Diagnosis Date Noted    Foreign body granuloma of soft tissue of right foot 06/05/2024     Priority: Medium    Puncture wound 05/03/2024     Priority: Medium    Psoriasis vulgaris 08/22/2022     Priority: Medium    Unspecified acute reaction to stress 12/16/2014     Priority: Medium     Replacement Utility updated for latest IMO load          Past Medical History:   Diagnosis Date    Arm skin lesion, left     Leg skin lesion, left     Psoriasis vulgaris 08/22/2022    Puncture wound 05/03/2024    Unspecified acute reaction to stress 12/16/2014    Replacement Utility updated for latest IMO load          Past Surgical History:   Procedure Laterality Date    EXCISE LESION LOWER EXTREMITY Left 10/27/2017    Procedure: EXCISION LESION LEFT THIGH;  Surgeon: Tristin Ramirez MD;  Location: [facility];  Service:     EXCISE LESION UPPER EXTREMITY Left 10/27/2017    Procedure: EXCISION LESION LEFT ARM;  Surgeon: Tristin Ramirez MD;  Location: [facility];  Service:     HERNIA REPAIR      as a child    [place] LAP,HERNIA REPAIR PROC,UNLIST      Description: Laparoscopy Repair Of Hernia;  Recorded: 07/06/2011;    VASECTOMY       No current outpatient medications on file.       No Known Allergies     Social History     Tobacco Use    Smoking status: Never    Smokeless tobacco: Never   Substance Use Topics    Alcohol use: Yes     Alcohol/week: 1.0 - 2.0  "standard drink of alcohol     Family History   Problem Relation Age of Onset    No Known Problems Sister     No Known Problems Brother      History   Drug Use No           Review of Systems   Constitutional:  Negative for chills and fever.   HENT:  Negative for congestion, sinus pressure, sinus pain and sore throat.    Eyes:  Negative for visual disturbance.   Respiratory:  Negative for cough, shortness of breath and wheezing.    Cardiovascular:  Negative for chest pain, palpitations and leg swelling.   Gastrointestinal:  Negative for abdominal pain, constipation, diarrhea, nausea and vomiting.   Endocrine: Negative for cold intolerance and heat intolerance.   Genitourinary: Negative.    Musculoskeletal:  Negative for arthralgias and back pain.   Skin:  Positive for wound (right foot - foreign body).   Allergic/Immunologic: Positive for environmental allergies (minor pollen allergies). Negative for food allergies.   Neurological:  Negative for weakness, numbness and headaches.   Hematological:  Does not bruise/bleed easily.   Psychiatric/Behavioral: Negative.     All other systems reviewed and are negative.        Objective    /89 (BP Location: Right arm, Patient Position: Sitting, Cuff Size: Adult Regular)   Pulse 72   Temp 97.9  F (36.6  C) (Temporal)   Resp 18   Ht 1.76 m (5' 9.29\")   Wt 94.3 kg (208 lb)   SpO2 97%   BMI 30.46 kg/m     Estimated body mass index is 30.46 kg/m  as calculated from the following:    Height as of this encounter: 1.76 m (5' 9.29\").    Weight as of this encounter: 94.3 kg (208 lb).  Physical Exam  Vitals reviewed.   Constitutional:       General: He is not in acute distress.     Appearance: Normal appearance.   HENT:      Head: Normocephalic.      Right Ear: Tympanic membrane, ear canal and external ear normal.      Left Ear: Tympanic membrane, ear canal and external ear normal.      Nose: Nose normal.      Mouth/Throat:      Mouth: Mucous membranes are moist.      Pharynx: " "No posterior oropharyngeal erythema.   Eyes:      Extraocular Movements: Extraocular movements intact.      Conjunctiva/sclera: Conjunctivae normal.      Pupils: Pupils are equal, round, and reactive to light.   Cardiovascular:      Rate and Rhythm: Normal rate and regular rhythm.      Pulses: Normal pulses.      Heart sounds: Normal heart sounds. No murmur heard.  Pulmonary:      Effort: Pulmonary effort is normal.      Breath sounds: Normal breath sounds.   Abdominal:      Palpations: Abdomen is soft. There is no mass.      Tenderness: There is no abdominal tenderness. There is no guarding or rebound.   Musculoskeletal:         General: No deformity. Normal range of motion.      Cervical back: Normal range of motion and neck supple.   Lymphadenopathy:      Cervical: No cervical adenopathy.   Skin:     General: Skin is warm and dry.      Comments: Mid plantar MTP joints of right foot - has evident granuloma - palpable subcutaneous foreign body;    Neurological:      General: No focal deficit present.      Mental Status: He is alert and oriented to person, place, and time.   Psychiatric:         Mood and Affect: Mood normal.         Behavior: Behavior normal.           No results for input(s): \"HGB\", \"PLT\", \"INR\", \"NA\", \"POTASSIUM\", \"CR\", \"A1C\" in the last 8760 hours.     Diagnostics  No results found for this or any previous visit (from the past 168 hour(s)).   No EKG required for low risk surgery (cataract, skin procedure, breast biopsy, etc).    Revised Cardiac Risk Index (RCRI)  The patient has the following serious cardiovascular risks for perioperative complications:   - No serious cardiac risks = 0 points     RCRI Interpretation: 0 points: Class I (very low risk - 0.4% complication rate)         Signed Electronically by: ALFREDO RASHID MD  Copy of this evaluation report is provided to requesting physician.           Prior to immunization administration, verified patients identity using " patient s name and date of birth. Please see Immunization Activity for additional information.     Screening Questionnaire for Adult Immunization    Are you sick today?   No   Do you have allergies to medications, food, a vaccine component or latex?   No   Have you ever had a serious reaction after receiving a vaccination?   No   Do you have a long-term health problem with heart, lung, kidney, or metabolic disease (e.g., diabetes), asthma, a blood disorder, no spleen, complement component deficiency, a cochlear implant, or a spinal fluid leak?  Are you on long-term aspirin therapy?   No   Do you have cancer, leukemia, HIV/AIDS, or any other immune system problem?   No   Do you have a parent, brother, or sister with an immune system problem?   No   In the past 3 months, have you taken medications that affect  your immune system, such as prednisone, other steroids, or anticancer drugs; drugs for the treatment of rheumatoid arthritis, Crohn s disease, or psoriasis; or have you had radiation treatments?   No   Have you had a seizure, or a brain or other nervous system problem?   No   During the past year, have you received a transfusion of blood or blood    products, or been given immune (gamma) globulin or antiviral drug?   No   For women: Are you pregnant or is there a chance you could become       pregnant during the next month?   No   Have you received any vaccinations in the past 4 weeks?   No     Immunization questionnaire answers were all negative.      Patient instructed to remain in clinic for 15 minutes afterwards, and to report any adverse reactions.     Screening performed by Verónica Estes CMA on 6/10/2024 at 3:15 PM.

## 2024-06-14 ENCOUNTER — ANESTHESIA EVENT (OUTPATIENT)
Dept: SURGERY | Facility: AMBULATORY SURGERY CENTER | Age: 49
End: 2024-06-14
Payer: COMMERCIAL

## 2024-06-14 ENCOUNTER — HOSPITAL ENCOUNTER (OUTPATIENT)
Facility: AMBULATORY SURGERY CENTER | Age: 49
Discharge: HOME | End: 2024-06-14
Attending: PODIATRIST
Payer: COMMERCIAL

## 2024-06-14 ENCOUNTER — ANESTHESIA (OUTPATIENT)
Dept: SURGERY | Facility: AMBULATORY SURGERY CENTER | Age: 49
End: 2024-06-14
Payer: COMMERCIAL

## 2024-06-14 VITALS
OXYGEN SATURATION: 95 % | TEMPERATURE: 97.1 F | RESPIRATION RATE: 16 BRPM | SYSTOLIC BLOOD PRESSURE: 115 MMHG | WEIGHT: 208 LBS | DIASTOLIC BLOOD PRESSURE: 61 MMHG | HEART RATE: 78 BPM | BODY MASS INDEX: 30.46 KG/M2

## 2024-06-14 DIAGNOSIS — M60.271 FOREIGN BODY GRANULOMA OF SOFT TISSUE OF RIGHT FOOT: ICD-10-CM

## 2024-06-14 PROCEDURE — 28190 REMOVAL OF FOOT FOREIGN BODY: CPT | Mod: RT | Performed by: PODIATRIST

## 2024-06-14 RX ORDER — PROPOFOL 10 MG/ML
INJECTION, EMULSION INTRAVENOUS CONTINUOUS PRN
Status: DISCONTINUED | OUTPATIENT
Start: 2024-06-14 | End: 2024-06-14

## 2024-06-14 RX ORDER — NALOXONE HYDROCHLORIDE 0.4 MG/ML
0.1 INJECTION, SOLUTION INTRAMUSCULAR; INTRAVENOUS; SUBCUTANEOUS
Status: CANCELLED | OUTPATIENT
Start: 2024-06-14

## 2024-06-14 RX ORDER — LIDOCAINE HYDROCHLORIDE 20 MG/ML
INJECTION, SOLUTION INFILTRATION; PERINEURAL PRN
Status: DISCONTINUED | OUTPATIENT
Start: 2024-06-14 | End: 2024-06-14

## 2024-06-14 RX ORDER — DEXAMETHASONE SODIUM PHOSPHATE 4 MG/ML
INJECTION, SOLUTION INTRA-ARTICULAR; INTRALESIONAL; INTRAMUSCULAR; INTRAVENOUS; SOFT TISSUE PRN
Status: DISCONTINUED | OUTPATIENT
Start: 2024-06-14 | End: 2024-06-14

## 2024-06-14 RX ORDER — FENTANYL CITRATE 50 UG/ML
INJECTION, SOLUTION INTRAMUSCULAR; INTRAVENOUS PRN
Status: DISCONTINUED | OUTPATIENT
Start: 2024-06-14 | End: 2024-06-14

## 2024-06-14 RX ORDER — ACETAMINOPHEN 325 MG/1
975 TABLET ORAL ONCE
Status: COMPLETED | OUTPATIENT
Start: 2024-06-14 | End: 2024-06-14

## 2024-06-14 RX ORDER — ONDANSETRON 4 MG/1
4 TABLET, ORALLY DISINTEGRATING ORAL EVERY 30 MIN PRN
Status: CANCELLED | OUTPATIENT
Start: 2024-06-14

## 2024-06-14 RX ORDER — ONDANSETRON 2 MG/ML
INJECTION INTRAMUSCULAR; INTRAVENOUS PRN
Status: DISCONTINUED | OUTPATIENT
Start: 2024-06-14 | End: 2024-06-14

## 2024-06-14 RX ORDER — CEPHALEXIN 500 MG/1
500 CAPSULE ORAL 2 TIMES DAILY
Qty: 14 CAPSULE | Refills: 0 | Status: SHIPPED | OUTPATIENT
Start: 2024-06-14 | End: 2024-06-21

## 2024-06-14 RX ORDER — ONDANSETRON 2 MG/ML
4 INJECTION INTRAMUSCULAR; INTRAVENOUS EVERY 30 MIN PRN
Status: CANCELLED | OUTPATIENT
Start: 2024-06-14

## 2024-06-14 RX ORDER — PROPOFOL 10 MG/ML
INJECTION, EMULSION INTRAVENOUS PRN
Status: DISCONTINUED | OUTPATIENT
Start: 2024-06-14 | End: 2024-06-14

## 2024-06-14 RX ORDER — OXYCODONE HYDROCHLORIDE 10 MG/1
10 TABLET ORAL
Status: CANCELLED | OUTPATIENT
Start: 2024-06-14

## 2024-06-14 RX ORDER — DEXAMETHASONE SODIUM PHOSPHATE 4 MG/ML
4 INJECTION, SOLUTION INTRA-ARTICULAR; INTRALESIONAL; INTRAMUSCULAR; INTRAVENOUS; SOFT TISSUE
Status: CANCELLED | OUTPATIENT
Start: 2024-06-14

## 2024-06-14 RX ORDER — ACETAMINOPHEN AND CODEINE PHOSPHATE 300; 30 MG/1; MG/1
1 TABLET ORAL EVERY 6 HOURS PRN
Qty: 10 TABLET | Refills: 0 | Status: SHIPPED | OUTPATIENT
Start: 2024-06-14 | End: 2024-06-17

## 2024-06-14 RX ORDER — SODIUM CHLORIDE, SODIUM LACTATE, POTASSIUM CHLORIDE, CALCIUM CHLORIDE 600; 310; 30; 20 MG/100ML; MG/100ML; MG/100ML; MG/100ML
INJECTION, SOLUTION INTRAVENOUS CONTINUOUS
Status: DISCONTINUED | OUTPATIENT
Start: 2024-06-14 | End: 2024-06-15 | Stop reason: HOSPADM

## 2024-06-14 RX ORDER — LIDOCAINE 40 MG/G
CREAM TOPICAL
Status: DISCONTINUED | OUTPATIENT
Start: 2024-06-14 | End: 2024-06-15 | Stop reason: HOSPADM

## 2024-06-14 RX ORDER — OXYCODONE HYDROCHLORIDE 5 MG/1
5 TABLET ORAL
Status: CANCELLED | OUTPATIENT
Start: 2024-06-14

## 2024-06-14 RX ORDER — FENTANYL CITRATE 0.05 MG/ML
25 INJECTION, SOLUTION INTRAMUSCULAR; INTRAVENOUS
Status: CANCELLED | OUTPATIENT
Start: 2024-06-14

## 2024-06-14 RX ADMIN — SODIUM CHLORIDE, SODIUM LACTATE, POTASSIUM CHLORIDE, CALCIUM CHLORIDE: 600; 310; 30; 20 INJECTION, SOLUTION INTRAVENOUS at 09:15

## 2024-06-14 RX ADMIN — ACETAMINOPHEN 975 MG: 325 TABLET ORAL at 09:16

## 2024-06-14 RX ADMIN — PROPOFOL 50 MG: 10 INJECTION, EMULSION INTRAVENOUS at 09:48

## 2024-06-14 RX ADMIN — DEXAMETHASONE SODIUM PHOSPHATE 4 MG: 4 INJECTION, SOLUTION INTRA-ARTICULAR; INTRALESIONAL; INTRAMUSCULAR; INTRAVENOUS; SOFT TISSUE at 09:55

## 2024-06-14 RX ADMIN — PROPOFOL 200 MCG/KG/MIN: 10 INJECTION, EMULSION INTRAVENOUS at 09:47

## 2024-06-14 RX ADMIN — LIDOCAINE HYDROCHLORIDE 3 ML: 20 INJECTION, SOLUTION INFILTRATION; PERINEURAL at 09:47

## 2024-06-14 RX ADMIN — FENTANYL CITRATE 50 MCG: 50 INJECTION, SOLUTION INTRAMUSCULAR; INTRAVENOUS at 09:47

## 2024-06-14 RX ADMIN — ONDANSETRON 4 MG: 2 INJECTION INTRAMUSCULAR; INTRAVENOUS at 09:56

## 2024-06-14 NOTE — INTERVAL H&P NOTE
"I have reviewed the surgical (or preoperative) H&P that is linked to this encounter, and examined the patient. There are no significant changes    Clinical Conditions Present on Arrival:  Clinically Significant Risk Factors Present on Admission                      # Obesity: Estimated body mass index is 30.46 kg/m  as calculated from the following:    Height as of 6/10/24: 1.76 m (5' 9.29\").    Weight as of 6/10/24: 94.3 kg (208 lb).       "

## 2024-06-14 NOTE — DISCHARGE INSTRUCTIONS
If you have any questions or concerns regarding your procedure, please contact Dr. Willis, his office number is [phone].    You received 975 mg of acetaminophen (Tylenol) at 9:15 AM. Please do not take an additional dose of Tylenol until after 3:15 pm.    Do not exceed 4,000 mg of acetaminophen in a 24 hour period, keep in mind that acetaminophen can also be found in many over-the-counter cold medications as well as narcotics that may be given for pain.      Same-Day Surgery - Adult Discharge Orders & Instructions     For 24 hours after surgery    Get plenty of rest.  A responsible adult must stay with you for at least 24 hours after you leave the hospital.     Do not drive or use heavy equipment.  If you have weakness or tingling, don't drive or use heavy equipment until this feeling goes away.    Do not drink alcohol.    Avoid strenuous or risky activities.  Ask for help when climbing stairs.     You may feel lightheaded.  If so, sit for a few minutes before standing.  Have someone help you get up.     You may have a slight fever. Call the doctor if your fever is over 100 F (37.7 C) (taken under the tongue) or lasts longer than 24 hours.    You may have a dry mouth, a sore throat, muscle aches or trouble sleeping.  These should go away after 24 hours.    Do not make important or legal decisions.    Based on the surgery/procedure that you had today, we do not expect that you will have any problems.  However, we want you to know what to do if you have pain, nausea, bleeding, or infection:    To control pain:  Take medicines your physician has prescribed or or over-the counter medicine he or she advises.  Ice packs and periods of rest are often helpful.  For surgery on an arm or leg, raise it on a pillow to ease swelling.  If your pain is not managed with the above methods, contact your physician.      To control nausea:  Take anti-nausea medicine approved by your physician.  Drink clear liquids such as  apple juice, ginger ale, broth or 7-Up. Be sure to drink enough fluids.  Move to a regular diet as you feel able.  Rest may also help.    Bleeding:  You may see a little blood on your dressing, about the size of a quarter in the first 24 hours.  If you see this, there is no reason to be alarmed.  However, if this continues to increase in size, apply pressure if able, and notify your physician.      Infection: Please contact your physician if you have any of the following signs:  redness, swelling, heat, increasing pain or foul-smelling drainage at your surgery site, fever or chills.    Call your doctor for any of the followin.  It has been over 8 to 10 hours since surgery and you are still not able to urinate (pass water).    2.  Headache for over 24 hours.    3.  Numbness, tingling or weakness in your legs the day after surgery (if you had spinal anesthesia).

## 2024-06-14 NOTE — ANESTHESIA CARE TRANSFER NOTE
Patient: Star Delgado    Procedure: Procedure(s):  REMOVAL, FOREIGN BODY right foot       Diagnosis: Foreign body granuloma of soft tissue of right foot [M60.271]  Diagnosis Additional Information: No value filed.    Anesthesia Type:   MAC     Note:    Oropharynx: oropharynx clear of all foreign objects and spontaneously breathing  Level of Consciousness: drowsy  Oxygen Supplementation: room air    Independent Airway: airway patency satisfactory and stable  Dentition: dentition unchanged  Vital Signs Stable: post-procedure vital signs reviewed and stable  Report to RN Given: handoff report given  Patient transferred to: Phase II    Handoff Report: Identifed the Patient, Identified the Reponsible Provider, Reviewed the pertinent medical history, Discussed the surgical course, Reviewed Intra-OP anesthesia mangement and issues during anesthesia, Set expectations for post-procedure period and Allowed opportunity for questions and acknowledgement of understanding      Vitals:  Vitals Value Taken Time   /61 06/14/24 1011   Temp 97.1  F (36.2  C) 06/14/24 1011   Pulse 72 06/14/24 1013   Resp 16 06/14/24 1011   SpO2 94 % 06/14/24 1013   Vitals shown include unfiled device data.    Electronically Signed By: JAKOB James CRNA  June 14, 2024  10:16 AM

## 2024-06-14 NOTE — OP NOTE
Date of surgery: 6/14/2024    Surgeon: Tee Willis D.P.M.    Preoperative diagnosis: Foreign body right foot    Postoperative diagnosis: Same    Procedure: Removal foreign body right foot    Anesthesia: MAC    Hemostasis: Pneumatic ankle tourniquet 250 mmHg    Medications: 2 g of Ancef preoperatively    Blood loss: None    Specimen: Porcelain glass right foot    Complication: None    Description: The patient was taken to the operating room and placed on the table in the supine position.  Under local anesthesia of 0.5% Marcaine plain and 2% lidocaine plain in a one-to-one mixture along with IV sedation the right foot was prepped and draped in usual aseptic manner.  Following exsanguination of the right foot with emergency bandage the tourniquet was inflated and the following procedure was performed.  Attention was directed to the plantar aspect of the right foot where 2 longitudinal semielliptical incisions were made approximately 3.0 cm in length encompassing a hyperkeratotic lesion.  This lesion is located subsecond metatarsal head of the right foot.  This elliptical portion of skin and subcutaneous tissue was then resected.  The subcutaneous tissue was then  in a triangular shaped piece of porcelain glass was easily identifiable within this elliptical portion of tissue.  The wound was explored to ensure no other foreign body was noted.  This was deemed to be satisfactory.  The wound was then irrigated with saline.  Deep closure was accomplished utilizing 3-0 Monocryl suture material.  Skin closure was accomplished utilizing 4-0 nylon suture material.  A sterile dressing was applied comprising of Betadine ointment, Adaptic, 4 x 4 gauze, 3 inch Piedad and Coban.  The tourniquet was then deflated and normal color return to the right foot.  The patient appeared to tolerate the procedure and anesthesia well and was transported from the operating room to the recovery room with vital signs stable and  neurovascular status intact.

## 2024-06-14 NOTE — ANESTHESIA POSTPROCEDURE EVALUATION
Patient: Star Delgado    Procedure: Procedure(s):  REMOVAL, FOREIGN BODY right foot       Anesthesia Type:  MAC    Note:  Disposition: Outpatient   Postop Pain Control: Uneventful            Sign Out: Well controlled pain   PONV: No   Neuro/Psych: Uneventful            Sign Out: Acceptable/Baseline neuro status   Airway/Respiratory: Uneventful            Sign Out: Acceptable/Baseline resp. status   CV/Hemodynamics: Uneventful            Sign Out: Acceptable CV status; No obvious hypovolemia; No obvious fluid overload   Other NRE: NONE   DID A NON-ROUTINE EVENT OCCUR? No           Last vitals:  Vitals Value Taken Time   /62 06/14/24 1045   Temp 97.1  F (36.2  C) 06/14/24 1011   Pulse 66 06/14/24 1054   Resp 16 06/14/24 1030   SpO2 97 % 06/14/24 1054   Vitals shown include unfiled device data.    Electronically Signed By: Jian Duffy MD  June 14, 2024  11:35 AM

## 2024-06-14 NOTE — ANESTHESIA PREPROCEDURE EVALUATION
Anesthesia Pre-Procedure Evaluation    Patient: Star Delgado   MRN: [id number] : 1975        Procedure : Procedure(s):  REMOVAL, FOREIGN BODY right foot          Past Medical History:   Diagnosis Date    Arm skin lesion, left     Leg skin lesion, left     Psoriasis vulgaris 2022    Puncture wound 2024    Unspecified acute reaction to stress 2014    Replacement Utility updated for latest IMO load           Past Surgical History:   Procedure Laterality Date    EXCISE LESION LOWER EXTREMITY Left 10/27/2017    Procedure: EXCISION LESION LEFT THIGH;  Surgeon: Tristin Ramirez MD;  Location: [facility];  Service:     EXCISE LESION UPPER EXTREMITY Left 10/27/2017    Procedure: EXCISION LESION LEFT ARM;  Surgeon: Tristin Ramirez MD;  Location: [facility];  Service:     HERNIA REPAIR      as a child    [place] LAP,HERNIA REPAIR PROC,UNLIST      Description: Laparoscopy Repair Of Hernia;  Recorded: 2011;    VASECTOMY        No Known Allergies   Social History     Tobacco Use    Smoking status: Never    Smokeless tobacco: Never   Substance Use Topics    Alcohol use: Not Currently     Alcohol/week: 1.0 - 2.0 standard drink of alcohol     Types: 1 - 2 Standard drinks or equivalent per week      Wt Readings from Last 1 Encounters:   24 94.3 kg (208 lb)        Anesthesia Evaluation   Pt has had prior anesthetic.     No history of anesthetic complications       ROS/MED HX  ENT/Pulmonary:  - neg pulmonary ROS     Neurologic:  - neg neurologic ROS     Cardiovascular:  - neg cardiovascular ROS     METS/Exercise Tolerance: >4 METS    Hematologic:  - neg hematologic  ROS     Musculoskeletal:  - neg musculoskeletal ROS     GI/Hepatic:  - neg GI/hepatic ROS     Renal/Genitourinary:  - neg Renal ROS     Endo:  - neg endo ROS   (+)               Obesity (bmi 30),       Psychiatric/Substance Use:  - neg psychiatric ROS     Infectious Disease:  - neg infectious disease ROS    "  Malignancy:  - neg malignancy ROS     Other:  - neg other ROS          Physical Exam    Airway        Mallampati: II   TM distance: > 3 FB   Neck ROM: full   Mouth opening: > 3 cm    Respiratory Devices and Support         Dental       (+) Multiple crowns, permanant bridges    B=Bridge, C=Chipped, L=Loose, M=Missing    Cardiovascular   cardiovascular exam normal          Pulmonary   pulmonary exam normal                OUTSIDE LABS:  CBC:   Lab Results   Component Value Date    WBC 6.1 01/24/2023    HGB 16.6 01/24/2023    HCT 48.6 01/24/2023     01/24/2023     BMP:   Lab Results   Component Value Date     01/24/2023    POTASSIUM 3.9 01/24/2023    CHLORIDE 106 01/24/2023    CO2 24 01/24/2023    BUN 10.2 01/24/2023    CR 0.94 01/24/2023    GLC 97 01/24/2023     COAGS: No results found for: \"PTT\", \"INR\", \"FIBR\"  POC: No results found for: \"BGM\", \"HCG\", \"HCGS\"  HEPATIC:   Lab Results   Component Value Date    ALBUMIN 4.3 01/24/2023    PROTTOTAL 6.5 01/24/2023    ALT 36 01/24/2023    AST 24 01/24/2023    ALKPHOS 81 01/24/2023    BILITOTAL 0.6 01/24/2023     OTHER:   Lab Results   Component Value Date    AR 9.0 01/24/2023       Anesthesia Plan    ASA Status:  2    NPO Status:  NPO Appropriate    Anesthesia Type: MAC.     - Reason for MAC: immobility needed, straight local not clinically adequate   Induction: Propofol.   Maintenance: TIVA.        Consents    Anesthesia Plan(s) and associated risks, benefits, and realistic alternatives discussed. Questions answered and patient/representative(s) expressed understanding.     - Discussed:     - Discussed with:  Patient            Postoperative Care    Pain management: Multi-modal analgesia.   PONV prophylaxis: Ondansetron (or other 5HT-3), Dexamethasone or Solumedrol     Comments:    Other Comments: Toradol if OK with surgeon    Reviewed anesthetic options and risks. Patient agrees to proceed.            Jian Duffy MD    I have reviewed the pertinent notes " "and labs in the chart from the past 30 days and (re)examined the patient.  Any updates or changes from those notes are reflected in this note.              # Obesity: Estimated body mass index is 30.46 kg/m  as calculated from the following:    Height as of 6/10/24: 1.76 m (5' 9.29\").    Weight as of this encounter: 94.3 kg (208 lb).      "

## 2024-06-24 ENCOUNTER — OFFICE VISIT (OUTPATIENT)
Dept: PODIATRY | Facility: CLINIC | Age: 49
End: 2024-06-24
Payer: COMMERCIAL

## 2024-06-24 VITALS — SYSTOLIC BLOOD PRESSURE: 156 MMHG | HEART RATE: 73 BPM | OXYGEN SATURATION: 96 % | DIASTOLIC BLOOD PRESSURE: 97 MMHG

## 2024-06-24 DIAGNOSIS — M60.271 FOREIGN BODY GRANULOMA OF SOFT TISSUE OF RIGHT FOOT: Primary | ICD-10-CM

## 2024-06-24 PROCEDURE — 99024 POSTOP FOLLOW-UP VISIT: CPT | Performed by: PODIATRIST

## 2024-06-24 RX ORDER — POLYMYXIN B SULFATE AND TRIMETHOPRIM 1; 10000 MG/ML; [USP'U]/ML
1-2 SOLUTION OPHTHALMIC
COMMUNITY
Start: 2024-06-22 | End: 2024-07-10

## 2024-06-24 ASSESSMENT — PAIN SCALES - GENERAL: PAINLEVEL: MILD PAIN (2)

## 2024-06-24 NOTE — PROGRESS NOTES
Subjective findings: The patient returns to the clinic today for postop visit #1, 1 week status post removal foreign body right foot.  The patient is in good spirits and he had no complaints.    Objective findings: The dressings were removed and the wound margins are well coaptated and maintained.  There is no edema, erythema, cellulitis, drainage or bleeding noted.  Neurovascular status is intact.  Vital signs stable.    Assessment: Foreign body right foot    Plan: The patient was instructed to return to the clinic in 1 week for postop visit 2 at which time the sutures will be removed.  A dressing was applied to the right foot today.  The patient is to keep the wound clean and dry for an additional week.

## 2024-06-24 NOTE — Clinical Note
6/24/2024      Star Delgado  [address] [place] [address]      Dear Colleague,    Thank you for referring your patient, Star Delgado, to the [facility]. Please see a copy of my visit note below.    Subjective findings: The patient returns to the clinic today for postop visit #1, 1 week status post removal foreign body right foot.  The patient is in good spirits and he had no complaints.    Objective findings: The dressings were removed and the wound margins are well coaptated and maintained.  There is no edema, erythema, cellulitis, drainage or bleeding noted.  Neurovascular status is intact.  Vital signs stable.    Assessment: Foreign body right foot    Plan: The patient was instructed to return to the clinic in 1 week for postop visit 2 at which time the sutures will be removed.  A dressing was applied to the right foot today.  The patient is to keep the wound clean and dry for an additional week.      Again, thank you for allowing me to participate in the care of your patient.        Sincerely,        Tee Willis DPM

## 2024-06-26 ENCOUNTER — MYC MEDICAL ADVICE (OUTPATIENT)
Dept: PODIATRY | Facility: CLINIC | Age: 49
End: 2024-06-26
Payer: COMMERCIAL

## 2024-07-01 ENCOUNTER — OFFICE VISIT (OUTPATIENT)
Dept: PODIATRY | Facility: CLINIC | Age: 49
End: 2024-07-01
Payer: COMMERCIAL

## 2024-07-01 VITALS — DIASTOLIC BLOOD PRESSURE: 89 MMHG | HEART RATE: 68 BPM | OXYGEN SATURATION: 98 % | SYSTOLIC BLOOD PRESSURE: 153 MMHG

## 2024-07-01 DIAGNOSIS — M60.271 FOREIGN BODY GRANULOMA OF SOFT TISSUE OF RIGHT FOOT: Primary | ICD-10-CM

## 2024-07-01 PROCEDURE — 99024 POSTOP FOLLOW-UP VISIT: CPT | Performed by: PODIATRIST

## 2024-07-01 ASSESSMENT — PAIN SCALES - GENERAL: PAINLEVEL: NO PAIN (0)

## 2024-07-01 NOTE — PROGRESS NOTES
Subjective findings: The patient returns to the clinic today for postop visit #2, 2 weeks status post removal foreign body right foot.  The patient is in good spirits and he had no complaints.     Objective findings: The dressings were removed and the wound margins are well coaptated and maintained.  There is no edema, erythema, cellulitis, drainage or bleeding noted.  Neurovascular status is intact.  Vital signs stable.     Assessment: Foreign body right foot     Plan: All sutures were removed today.  The patient was instructed to gradually return to normal activities and normal bathing.  He is to return to the clinic as needed.

## 2024-07-01 NOTE — Clinical Note
7/1/2024      Star Delgado  [address] [place] [address]      Dear Colleague,    Thank you for referring your patient, Star Delgado, to the [facility]. Please see a copy of my visit note below.    Subjective findings: The patient returns to the clinic today for postop visit #2, 2 weeks status post removal foreign body right foot.  The patient is in good spirits and he had no complaints.     Objective findings: The dressings were removed and the wound margins are well coaptated and maintained.  There is no edema, erythema, cellulitis, drainage or bleeding noted.  Neurovascular status is intact.  Vital signs stable.     Assessment: Foreign body right foot     Plan: All sutures were removed today.  The patient was instructed to gradually return to normal activities and normal bathing.  He is to return to the clinic as needed. The patient is to keep the wound clean and dry for an additional week.      Again, thank you for allowing me to participate in the care of your patient.        Sincerely,        Tee Willis DPM

## 2024-10-27 ENCOUNTER — HEALTH MAINTENANCE LETTER (OUTPATIENT)
Age: 49
End: 2024-10-27